# Patient Record
Sex: FEMALE | Race: WHITE | NOT HISPANIC OR LATINO | Employment: OTHER | URBAN - METROPOLITAN AREA
[De-identification: names, ages, dates, MRNs, and addresses within clinical notes are randomized per-mention and may not be internally consistent; named-entity substitution may affect disease eponyms.]

---

## 2020-05-18 ENCOUNTER — TRANSFERRED RECORDS (OUTPATIENT)
Dept: HEALTH INFORMATION MANAGEMENT | Facility: CLINIC | Age: 30
End: 2020-05-18

## 2020-05-18 ENCOUNTER — MEDICAL CORRESPONDENCE (OUTPATIENT)
Dept: HEALTH INFORMATION MANAGEMENT | Facility: CLINIC | Age: 30
End: 2020-05-18

## 2020-05-19 ENCOUNTER — TRANSCRIBE ORDERS (OUTPATIENT)
Dept: MATERNAL FETAL MEDICINE | Facility: CLINIC | Age: 30
End: 2020-05-19

## 2020-05-19 DIAGNOSIS — O26.90 PREGNANCY RELATED CONDITION, ANTEPARTUM: Primary | ICD-10-CM

## 2020-06-09 ENCOUNTER — HOSPITAL ENCOUNTER (OUTPATIENT)
Dept: CARDIOLOGY | Facility: CLINIC | Age: 30
Discharge: HOME | End: 2020-06-09
Admitting: PEDIATRICS
Payer: COMMERCIAL

## 2020-06-09 DIAGNOSIS — O26.90 PREGNANCY RELATED CONDITION, ANTEPARTUM: ICD-10-CM

## 2020-06-09 PROCEDURE — 76825 ECHO EXAM OF FETAL HEART: CPT

## 2020-06-09 NOTE — PROGRESS NOTES
[facility]   Heart Center Fetal Consult Note    Patient:  Destinee Caba MRN:  [id number]   YOB: 1990 Age:  29 year old   Date of Visit:  2020 PCP:  No primary care provider on file.     Dear Dr. Ortiz,     I had the pleasure of seeing Destinee Caba at the [facility] on 2020 in fetal cardiology consultation for fetal echocardiogram results. She presented today accompanied by herself. As you know, she is a 29 year old who presents for type II diabetes mellitus on insulin.     I performed and interpreted the fetal echocardiogram today, which demonstrated normal fetal cardiac anatomy. Normal fetal intracardiac connections. Normal right and left ventricular size and function. Fetal heart rate is regular at 157 bpm. No hydrops.    I reviewed the echo findings today with Destinee Caba. She is aware that the study was within normal limits with no major cardiac abnormalities. She is aware of the general limitations of fetal echocardiography. No additional fetal echocardiograms are recommended. No  cardiac follow-up is required.     Thank you for allowing me to participate in Destinee's care. Please do not hesitate to contact me with questions or concerns.    This visit was separate from the performance and interpretation of the ultrasound. The majority of the time (>50%) was spent in counseling and coordination of care. I spent approximately 15 minutes in face-to-face time reviewing the above considerations.    Tiffanie Jorge M.D.  Pediatric Cardiology  [address], 5th floor, [facility] [address]  Phone [phone]  Fax [phone]

## 2022-05-02 ENCOUNTER — PRENATAL OFFICE VISIT (OUTPATIENT)
Dept: FAMILY MEDICINE | Facility: CLINIC | Age: 32
End: 2022-05-02
Payer: COMMERCIAL

## 2022-05-02 VITALS — HEIGHT: 66 IN

## 2022-05-02 DIAGNOSIS — Z34.90 SUPERVISION OF NORMAL PREGNANCY: Primary | ICD-10-CM

## 2022-05-02 PROCEDURE — 99207 PR NO CHARGE NURSE ONLY: CPT

## 2022-05-02 RX ORDER — PRENATAL VIT/IRON FUM/FOLIC AC 27MG-0.8MG
1 TABLET ORAL DAILY
COMMUNITY

## 2022-05-02 RX ORDER — ALBUTEROL SULFATE 90 UG/1
1-2 AEROSOL, METERED RESPIRATORY (INHALATION)
COMMUNITY
Start: 2021-12-15

## 2022-05-02 NOTE — PROGRESS NOTES
OB Intake phone visit with verbal patient permission.    Tested positive for covid 12/2021.  She is not vaccinated.

## 2022-05-23 ENCOUNTER — TELEPHONE (OUTPATIENT)
Dept: ENDOCRINOLOGY | Facility: CLINIC | Age: 32
End: 2022-05-23

## 2022-05-23 ENCOUNTER — LAB (OUTPATIENT)
Dept: LAB | Facility: CLINIC | Age: 32
End: 2022-05-23
Payer: COMMERCIAL

## 2022-05-23 ENCOUNTER — HOSPITAL ENCOUNTER (OUTPATIENT)
Dept: ULTRASOUND IMAGING | Facility: CLINIC | Age: 32
Discharge: HOME | End: 2022-05-23
Attending: OBSTETRICS & GYNECOLOGY | Admitting: OBSTETRICS & GYNECOLOGY
Payer: COMMERCIAL

## 2022-05-23 ENCOUNTER — PRENATAL OFFICE VISIT (OUTPATIENT)
Dept: OBGYN | Facility: CLINIC | Age: 32
End: 2022-05-23
Payer: COMMERCIAL

## 2022-05-23 VITALS
OXYGEN SATURATION: 99 % | WEIGHT: 168.6 LBS | HEART RATE: 97 BPM | BODY MASS INDEX: 27.1 KG/M2 | HEIGHT: 66 IN | SYSTOLIC BLOOD PRESSURE: 110 MMHG | DIASTOLIC BLOOD PRESSURE: 60 MMHG

## 2022-05-23 DIAGNOSIS — O24.410 DIET CONTROLLED GESTATIONAL DIABETES MELLITUS (GDM) IN FIRST TRIMESTER: Primary | ICD-10-CM

## 2022-05-23 DIAGNOSIS — Z34.91 NORMAL PREGNANCY IN FIRST TRIMESTER: Primary | ICD-10-CM

## 2022-05-23 DIAGNOSIS — Z86.32 HISTORY OF INSULIN CONTROLLED GESTATIONAL DIABETES MELLITUS: ICD-10-CM

## 2022-05-23 DIAGNOSIS — Z34.90 SUPERVISION OF NORMAL PREGNANCY: ICD-10-CM

## 2022-05-23 LAB
ABO/RH(D): NORMAL
ANTIBODY SCREEN: NEGATIVE
ERYTHROCYTE [DISTWIDTH] IN BLOOD BY AUTOMATED COUNT: 12.4 % (ref 10–15)
HBA1C MFR BLD: 5.9 % (ref 0–5.6)
HCT VFR BLD AUTO: 38.9 % (ref 35–47)
HGB BLD-MCNC: 12.7 G/DL (ref 11.7–15.7)
MCH RBC QN AUTO: 28 PG (ref 26.5–33)
MCHC RBC AUTO-ENTMCNC: 32.6 G/DL (ref 31.5–36.5)
MCV RBC AUTO: 86 FL (ref 78–100)
PLATELET # BLD AUTO: 191 10E3/UL (ref 150–450)
RBC # BLD AUTO: 4.53 10E6/UL (ref 3.8–5.2)
SPECIMEN EXPIRATION DATE: NORMAL
WBC # BLD AUTO: 6.7 10E3/UL (ref 4–11)

## 2022-05-23 PROCEDURE — 86762 RUBELLA ANTIBODY: CPT

## 2022-05-23 PROCEDURE — 87086 URINE CULTURE/COLONY COUNT: CPT

## 2022-05-23 PROCEDURE — 83036 HEMOGLOBIN GLYCOSYLATED A1C: CPT

## 2022-05-23 PROCEDURE — 36415 COLL VENOUS BLD VENIPUNCTURE: CPT

## 2022-05-23 PROCEDURE — 76805 OB US >/= 14 WKS SNGL FETUS: CPT

## 2022-05-23 PROCEDURE — 86780 TREPONEMA PALLIDUM: CPT

## 2022-05-23 PROCEDURE — 99207 PR PRENATAL VISIT: CPT | Performed by: OBSTETRICS & GYNECOLOGY

## 2022-05-23 PROCEDURE — 87340 HEPATITIS B SURFACE AG IA: CPT

## 2022-05-23 PROCEDURE — 86803 HEPATITIS C AB TEST: CPT

## 2022-05-23 PROCEDURE — 86901 BLOOD TYPING SEROLOGIC RH(D): CPT

## 2022-05-23 PROCEDURE — 85027 COMPLETE CBC AUTOMATED: CPT

## 2022-05-23 PROCEDURE — 86850 RBC ANTIBODY SCREEN: CPT

## 2022-05-23 PROCEDURE — 87389 HIV-1 AG W/HIV-1&-2 AB AG IA: CPT

## 2022-05-23 PROCEDURE — 86900 BLOOD TYPING SEROLOGIC ABO: CPT

## 2022-05-23 NOTE — TELEPHONE ENCOUNTER
[facility] Call Center    Phone Message    May a detailed message be left on voicemail: yes     Reason for Call: Other: Patient is being referred to be seen for  Normal pregnancy in first trimester; Hx of GDM. Ref by Dr Hester. Patient is scheduled on 9/28/22 at 12:00pm with Dr Hawkins. Appointment is outside the 3 day time frame per guidelines. Sending encounter to clinic for review. Please call patient to schedule.     Action Taken: Message routed to:  [facility] ([facility]): Endocrinology    Travel Screening: Not Applicable

## 2022-05-23 NOTE — TELEPHONE ENCOUNTER
Please schedule patient with DIabetes Education and MD  Within one week . Svetlana Webster RN on 5/23/2022 at 4:35 PM

## 2022-05-24 LAB
HBV SURFACE AG SERPL QL IA: NONREACTIVE
HCV AB SERPL QL IA: NONREACTIVE
HIV 1+2 AB+HIV1 P24 AG SERPL QL IA: NONREACTIVE
RUBV IGG SERPL QL IA: 1.2 INDEX
RUBV IGG SERPL QL IA: POSITIVE
T PALLIDUM AB SER QL: NONREACTIVE

## 2022-05-25 LAB — BACTERIA UR CULT: NORMAL

## 2022-05-31 NOTE — TELEPHONE ENCOUNTER
RECORDS RECEIVED FROM: internal    DATE RECEIVED: 6/7/22    NOTES (FOR ALL VISITS) STATUS DETAILS   OFFICE NOTES from referring provider internal  Dr Hester   OFFICE NOTES from other specialist     ED NOTES internal  9.24.20    OPERATIVE REPORT  (thyroid, pituitary, adrenal, parathyroid)     MEDICATION LIST internal       LABS     DIABETES: HBGA1C, CREATININE, FASTING LIPIDS, MICROALBUMIN URINE, POTASSIUM, TSH, T4    THYROID: TSH, T4, CBC, THYRODLONULIN, TOTAL T3, FREE T4, CALCITONIN, CEA internal  PTIQ5B-5.23.22  Cbc- 5.23.22

## 2022-06-02 NOTE — PROGRESS NOTES
"  SUBJECTIVE:     HPI:    This is a 31 year old female patient,  who presents for her first obstetrical visit.    MAURICIO: 2022, by Last Menstrual Period.  She is 16w6d weeks.  Her cycles are regular.  Her last menstrual period was normal.   Since her LMP, she has had no complaints).   She denies nausea and vaginal bleeding.    Additional History: Destinee has been pregnant 3 times and each gestation was noted for history of gest DM.  She has had both diet controlled as well as insulin medicated management.  We discussed having endocrine evaluate early in pregnant for that reason.    Have you travelled during the pregnancy?No  Have your sexual partner(s) travelled during the pregnancy?No      HISTORY:   Planned Pregnancy: Yes  Marital Status:     Living in Household: Spouse and Children    Past History:  Her past medical history is non-contributory.      She has a history of  gestational diabetes, diet controlled and gestational diabetes, insulin controlled    Since her last LMP she denies use of alcohol, tobacco and street drugs.    Past medical, surgical, social and family history were reviewed and updated in [facility].        Current Outpatient Medications   Medication     albuterol (PROAIR HFA/PROVENTIL HFA/VENTOLIN HFA) 108 (90 Base) MCG/ACT inhaler     Loratadine (CLARITIN PO)     Prenatal Vit-Fe Fumarate-FA (PRENATAL MULTIVITAMIN W/IRON) 27-0.8 MG tablet     No current facility-administered medications for this visit.       ROS:   12 point review of systems negative other than symptoms noted below or in the HPI.      OBJECTIVE:     EXAM:  /60 (BP Location: Right arm, Patient Position: Sitting, Cuff Size: Adult Regular)   Pulse 97   Ht 1.684 m (5' 6.3\")   Wt 76.5 kg (168 lb 9.6 oz)   LMP 2022   SpO2 99%   BMI 26.97 kg/m   Body mass index is 26.97 kg/m .  HEENT  Normal  Lungs CTA  Heart RRR  Breast deferred  Abd soft non tender + BS  No HSM  Ext without edema      ASSESSMENT/PLAN: "       ICD-10-CM    1. Normal pregnancy in first trimester  Z34.91 Adult Endocrinology  Referral   2. History of insulin controlled gestational diabetes mellitus  Z86.32 Adult Endocrinology  Referral       31 year old , 16w6d weeks of pregnancy with MAURICIO of 2022, by Last Menstrual Period    Discussed as follows:as above    Counseling given:   - Follow up in 4-6 weeks for return OB visit.  - Recommended weight gain for pregnancy: 15-25 lbs.   Follow up with endo for dm eval and management         Flaquito Hester MD

## 2022-06-02 NOTE — ADDENDUM NOTE
Addended by: KRISTIN JOHNSON on: 6/2/2022 09:51 AM     Modules accepted: Level of Service, SmartSet

## 2022-06-03 NOTE — PROGRESS NOTES
Outcome for 06/03/22 1:48 PM: Left Voicemail   TOBY Medina   Outcome for 06/06/22 9:35 AM: Data obtained via phone and located below  TOBY Medina    May 23    5/23: , after breakfast 140, after lunch 137, after supper 117  5/24: , after breakfast 150, after lunch 138, after supper 125  5/25: , after breakfast 140, after lunch 117, after supper 133  5/26: , after breakfast 150, after lunch 111, after supper 110    Was on vacation and did not have meter with from 5/27-6/4.    6/5: , after breakfast 145, after lunch 112, after supper 133  6/6:

## 2022-06-06 NOTE — TELEPHONE ENCOUNTER
Spoke with patient. She declined to schedule with CDE. She said she would like to discuss with Dr Song during her appt on 6/7 if seeing CDE is necessary

## 2022-06-07 ENCOUNTER — PRE VISIT (OUTPATIENT)
Dept: ENDOCRINOLOGY | Facility: CLINIC | Age: 32
End: 2022-06-07

## 2022-06-07 ENCOUNTER — VIRTUAL VISIT (OUTPATIENT)
Dept: ENDOCRINOLOGY | Facility: CLINIC | Age: 32
End: 2022-06-07
Payer: COMMERCIAL

## 2022-06-07 DIAGNOSIS — Z34.91 NORMAL PREGNANCY IN FIRST TRIMESTER: ICD-10-CM

## 2022-06-07 DIAGNOSIS — Z86.32 HISTORY OF INSULIN CONTROLLED GESTATIONAL DIABETES MELLITUS: ICD-10-CM

## 2022-06-07 PROCEDURE — 99204 OFFICE O/P NEW MOD 45 MIN: CPT | Mod: 95 | Performed by: INTERNAL MEDICINE

## 2022-06-07 RX ORDER — PEN NEEDLE, DIABETIC 32GX 5/32"
NEEDLE, DISPOSABLE MISCELLANEOUS
Qty: 200 EACH | Refills: 3 | Status: SHIPPED | OUTPATIENT
Start: 2022-06-07

## 2022-06-07 NOTE — PROGRESS NOTES
Destinee Caba  is being evaluated via a billable video visit.      How would you like to obtain your AVS? [employer]  For the video visit, send the invitation by: Send to e-mail at: [email or web address]  Will anyone else be joining your video visit? No        Endocrinology Clinic New Consult Video viist  Time visit started: 8 AM  Time visit  Ended: 8: 44 AM  Location of patient: home  Location of provider: [employer]  Platform: Nati     Destinee Caba MRN:[id number] YOB: 1990  Primary care provider: No Ref-Primary, Physician     Reason for Endocrine consult: Hx of insulin controlled GDM and pregnancy    HPI:  Destinee Caba is a 31 year old female  , currently 17w 4 d pregnant with MAURICIO: 22. HbA1c: 5.9 in 22.  Weight 76.5 kg    Destinee has been pregnant 3 times and each gestation was noted for history of gestational DM.  She has had both diet controlled as well as insulin medicated management.     First pregnancy was a miscarriage  Second pregnancy she had a boy, had shoulder dystocia. During that pregnancy she used glyburide at night time and that was enough to keep her BG under target    From chart review, for her third pregnancy at term she has used NPH 55 units at bedtime and novolog or humalog with meals upto 14 units with each meal. She has previously seen diabetes educator at OSH for carb counting and was advised to do 2-3 carb choices per meal.    For her 4 th pregnancy , she had her OBGYN care at [place],  At 12 weeks pregnant she had her HbA1c was checked and it was at 9. She used NPH 12 units at bedtime throughout the preganncy and after diet modification her HbA1c was 5.1.    Every post partum 6 week check her BG were well controlled and she was never on any oral or injections for her blood glucoses.     Denies any nausea, vomiting, She reports losing weight on cutting down her carbs and increase physical activity here usual weight is 165-168 lbs.  "Being followed by her OBGYN.  Her most recent fetal US was okay.Denies any  Numbness or tingling, intermittent blurring of vision. She hasn't seen a diabetes educator this pregnancy    Diet:  3 meals a day, 3 snacks in between each including high protein snack before bed  Breakfast is the biggest meal for the day. Snack on string cheese, meat, nuts with dark chocolate, carrots and hummus. She does carb counting for her meals - does 2 carb choices    Physical activity: Does go for walks with her kids, does weight lifts 2 times a week, low impact cardio - 3 times a week    Checking B times a day , fasting and 1 hr after meals    May 23     5/23: , after breakfast 140, after lunch 137, after supper 117  : , after breakfast 150, after lunch 138, after supper 125  : , after breakfast 140, after lunch 117, after supper 133  : , after breakfast 150, after lunch 111, after supper 110     Was on vacation and did not have meter with from -.     : , after breakfast 145, after lunch 112, after supper 133  :     Any low BG: At around 60-70 mg/dl, she starts to feel jittery, anxiety, shakes, feels weak. She corrects with 15 gms of carb and adds protein. Like granola bar with cheese stick. She has had low BG between lunch and dinner or is she skips snack at 2: 30 Pm or 3 pm.     FH: mother, father, grandparents have  T2DM    SH: , has 3 kids, no tobacco, 2 drinks of  alcohol use when not pregnant, no illicit drug use.      ROS:  All 12 systems were reviewed and negative except as mentioned in HPI    Past Medical/Surgical History:  Past Medical History:   Diagnosis Date     History of asthma     \"seasonal\"     History of gestational diabetes     \"high pre-diabetic level vs. type 2 per endocrinologist\"     Past Surgical History:   Procedure Laterality Date     WISDOM TOOTH EXTRACTION         Allergies:  No Known Allergies    PTA Meds:  Prior to Admission " medications    Medication Sig Last Dose Taking? Auth Provider   albuterol (PROAIR HFA/PROVENTIL HFA/VENTOLIN HFA) 108 (90 Base) MCG/ACT inhaler Inhale 1-2 puffs into the lungs   Reported, Patient   Loratadine (CLARITIN PO)    Reported, Patient   Prenatal Vit-Fe Fumarate-FA (PRENATAL MULTIVITAMIN W/IRON) 27-0.8 MG tablet Take 1 tablet by mouth daily   Reported, Patient        Current heard:   Current Outpatient Medications   Medication     albuterol (PROAIR HFA/PROVENTIL HFA/VENTOLIN HFA) 108 (90 Base) MCG/ACT inhaler     Loratadine (CLARITIN PO)     Prenatal Vit-Fe Fumarate-FA (PRENATAL MULTIVITAMIN W/IRON) 27-0.8 MG tablet     No current facility-administered medications for this visit.       Family History:  Family History   Problem Relation Age of Onset     Obesity Mother      Diabetes Mother         type 2     Obesity Father      Diabetes Father         type 2     Asthma Brother      No Known Problems Daughter      No Known Problems Son      No Known Problems Son        Social History:  Social History     Tobacco Use     Smoking status: Never Smoker     Smokeless tobacco: Never Used   Substance Use Topics     Alcohol use: Not on file         Physical examination:  General appearance: seated comfortably in the chair during assessment. Not in any acute distress  Lungs: Speaking full sentences  Neurological: conscious and oriented. Speech: normal.   Psychiatric: normal mood and affect. Normal judgment    Endocrine Labs:  ENDO DIABETES Latest Ref Rng & Units 2022   A1C 0.0 - 5.6 % 5.9 (H)   HCT 35.0 - 47.0 % 38.9   HGB 11.7 - 15.7 g/dL 12.7   RBC 3.80 - 5.20 10e6/uL 4.53   RDW 10.0 - 15.0 % 12.4   WBC 4.0 - 11.0 10e3/uL 6.7   MCH 26.5 - 33.0 pg 28.0   MCHC 31.5 - 36.5 g/dL 32.6   MCV 78 - 100 fL 86    - 450 10e3/uL 191          Assessment and Plan:     Destinee Caba is a 31 year old female  , currently 17w 4 d pregnant with MAURICIO: 22. HbA1c: 5.9 in 22 refrred to our clinic for  Gestational diabetes management    # Gestational Diabetes , 17w 4d pregnant, HbA11: 5.9 in 5/23/2022    She has had gestational diabetes with 3 of her pregnancies.  For her previous pregnancy she was requiring NPH 55 units and NovoLog 14 units with meals at term.  Her diet is fairly well controlled in terms of carb intake takes 2 carbs per meal with breakfast being her biggest meal.  Her weight gain is not as much as expected raising the concern for inadequate nourishment.  On reviewing her blood glucose her blood glucoses are above target fasting and 1 hour post breakfast.  Will benefit from NPH and dietitian input.  Anticipate she will need higher doses of insulin as her pregnancy progresses.    -Reviewed blood glucose goals during pregnancy  Fasting< 95 mg/dl  1 hr after meal: < 140 mg/dl  2 hr after meal< 120 mg/dl  -Recommended she check blood glucose fasting, 1 hr after each meal  -Recommend NPH 10 units subcutaneous  at bedtime, if fasting BG > 95 mg/dl , can increase the NPH dose by 2 units every 3 days fasting glucose <  95 mg/dL  -Discuss with her OBGYN if she is getting adequate carbohydrate intake   - Made a diabetes educator and dietician referral in 1 week  -Ordered a BMP to check for creatinine    RTC in 4 weeks and follow-up with CDE and dietitian interim for adjustments    The patient was seen, examined and discussed with MD Stanley East MD.  Endocrinology fellow     I have seen and examined the patient, reviewed and edited the fellow's note, and agree with the plan of care.  MARIE Mcarthur

## 2022-06-07 NOTE — PATIENT INSTRUCTIONS
Blood glucose goals during pregnancy  Fasting< 95 mg/dl  1 hr after meal: < 140 mg/dl  2 hr after meal< 120 mg/dl    Check blood glucose fasting, 1 hr after each meal    Start taking NPH 10 units subcutaneous  at bedtime, if fasting BG > 95 mg/dl , you can increase the NPH dose by 2 units every 3 days    Please speak with your OBGYN if you are getting adequate carbohydrate intake and have adequate nourishment    We made a diabetes educator and dietician referral    Get a BMP blood work done - for kidney function    Please reach out to us for any questions or concerns through [facility]

## 2022-06-07 NOTE — LETTER
2022       RE: Destinee Caba  [address] [facility] [address]     Dear Colleague,    Thank you for referring your patient, Destinee Caba, to the [facility] ENDOCRINOLOGY [facility] [place] [facility]. Please see a copy of my visit note below.    Outcome for 22 1:48 PM: Left Voicemail   Haydee Fischer, TOBY   Outcome for 22 9:35 AM: Data obtained via phone and located below  TOBY Medina    May 23    5/23: , after breakfast 140, after lunch 137, after supper 117  : , after breakfast 150, after lunch 138, after supper 125  : , after breakfast 140, after lunch 117, after supper 133  : , after breakfast 150, after lunch 111, after supper 110    Was on vacation and did not have meter with from -.    : , after breakfast 145, after lunch 112, after supper 133  :         Destinee Caba  is being evaluated via a billable video visit.      How would you like to obtain your AVS? [employer]  For the video visit, send the invitation by: Send to e-mail at: [email or web address]  Will anyone else be joining your video visit? No        Endocrinology Clinic New Consult Video viist  Time visit started: 8 AM  Time visit  Ended: 8: 44 AM  Location of patient: home  Location of provider: [facility]  Platform: Nati     Destinee Caba MRN:[id number] YOB: 1990  Primary care provider: No Ref-Primary, Physician     Reason for Endocrine consult: Hx of insulin controlled GDM and pregnancy    HPI:  Destinee Caba is a 31 year old female  , currently 17w 4 d pregnant with MAURICIO: 22. HbA1c: 5.9 in 22.  Weight 76.5 kg    Destinee has been pregnant 3 times and each gestation was noted for history of gestational DM.  She has had both diet controlled as well as insulin medicated management.     First pregnancy was a miscarriage  Second pregnancy she  had a boy, had shoulder dystocia. During that pregnancy she used glyburide at night time and that was enough to keep her BG under target    From chart review, for her third pregnancy at term she has used NPH 55 units at bedtime and novolog or humalog with meals upto 14 units with each meal. She has previously seen diabetes educator at OSH for carb counting and was advised to do 2-3 carb choices per meal.    For her 4 th pregnancy , she had her OBGYN care at [place],  At 12 weeks pregnant she had her HbA1c was checked and it was at 9. She used NPH 12 units at bedtime throughout the preganncy and after diet modification her HbA1c was 5.1.    Every post partum 6 week check her BG were well controlled and she was never on any oral or injections for her blood glucoses.     Denies any nausea, vomiting, She reports losing weight on cutting down her carbs and increase physical activity here usual weight is 165-168 lbs. Being followed by her OBGYN.  Her most recent fetal US was okay.Denies any  Numbness or tingling, intermittent blurring of vision. She hasn't seen a diabetes educator this pregnancy    Diet:  3 meals a day, 3 snacks in between each including high protein snack before bed  Breakfast is the biggest meal for the day. Snack on string cheese, meat, nuts with dark chocolate, carrots and hummus. She does carb counting for her meals - does 2 carb choices    Physical activity: Does go for walks with her kids, does weight lifts 2 times a week, low impact cardio - 3 times a week    Checking B times a day , fasting and 1 hr after meals    May 23     5/23: , after breakfast 140, after lunch 137, after supper 117  : , after breakfast 150, after lunch 138, after supper 125  : , after breakfast 140, after lunch 117, after supper 133  : , after breakfast 150, after lunch 111, after supper 110     Was on vacation and did not have meter with from -.     : , after breakfast  "145, after lunch 112, after supper 133  6/6:     Any low BG: At around 60-70 mg/dl, she starts to feel jittery, anxiety, shakes, feels weak. She corrects with 15 gms of carb and adds protein. Like granola bar with cheese stick. She has had low BG between lunch and dinner or is she skips snack at 2: 30 Pm or 3 pm.     FH: mother, father, grandparents have  T2DM    SH: , has 3 kids, no tobacco, 2 drinks of  alcohol use when not pregnant, no illicit drug use.      ROS:  All 12 systems were reviewed and negative except as mentioned in HPI    Past Medical/Surgical History:  Past Medical History:   Diagnosis Date     History of asthma     \"seasonal\"     History of gestational diabetes     \"high pre-diabetic level vs. type 2 per endocrinologist\"     Past Surgical History:   Procedure Laterality Date     WISDOM TOOTH EXTRACTION  2010       Allergies:  No Known Allergies    PTA Meds:  Prior to Admission medications    Medication Sig Last Dose Taking? Auth Provider   albuterol (PROAIR HFA/PROVENTIL HFA/VENTOLIN HFA) 108 (90 Base) MCG/ACT inhaler Inhale 1-2 puffs into the lungs   Reported, Patient   Loratadine (CLARITIN PO)    Reported, Patient   Prenatal Vit-Fe Fumarate-FA (PRENATAL MULTIVITAMIN W/IRON) 27-0.8 MG tablet Take 1 tablet by mouth daily   Reported, Patient        Current heard:   Current Outpatient Medications   Medication     albuterol (PROAIR HFA/PROVENTIL HFA/VENTOLIN HFA) 108 (90 Base) MCG/ACT inhaler     Loratadine (CLARITIN PO)     Prenatal Vit-Fe Fumarate-FA (PRENATAL MULTIVITAMIN W/IRON) 27-0.8 MG tablet     No current facility-administered medications for this visit.       Family History:  Family History   Problem Relation Age of Onset     Obesity Mother      Diabetes Mother         type 2     Obesity Father      Diabetes Father         type 2     Asthma Brother      No Known Problems Daughter      No Known Problems Son      No Known Problems Son        Social History:  Social History "     Tobacco Use     Smoking status: Never Smoker     Smokeless tobacco: Never Used   Substance Use Topics     Alcohol use: Not on file         Physical examination:  General appearance: seated comfortably in the chair during assessment. Not in any acute distress  Lungs: Speaking full sentences  Neurological: conscious and oriented. Speech: normal.   Psychiatric: normal mood and affect. Normal judgment    Endocrine Labs:  ENDO DIABETES Latest Ref Rng & Units 2022   A1C 0.0 - 5.6 % 5.9 (H)   HCT 35.0 - 47.0 % 38.9   HGB 11.7 - 15.7 g/dL 12.7   RBC 3.80 - 5.20 10e6/uL 4.53   RDW 10.0 - 15.0 % 12.4   WBC 4.0 - 11.0 10e3/uL 6.7   MCH 26.5 - 33.0 pg 28.0   MCHC 31.5 - 36.5 g/dL 32.6   MCV 78 - 100 fL 86    - 450 10e3/uL 191          Assessment and Plan:     Destinee Caba is a 31 year old female  , currently 17w 4 d pregnant with MAURICIO: 22. HbA1c: 5.9 in 22 refrred to our clinic for Gestational diabetes management    # Gestational Diabetes , 17w 4d pregnant, HbA11: 5.9 in 2022    She has had gestational diabetes with 3 of her pregnancies.  For her previous pregnancy she was requiring NPH 55 units and NovoLog 14 units with meals at term.  Her diet is fairly well controlled in terms of carb intake takes 2 carbs per meal with breakfast being her biggest meal.  Her weight gain is not as much as expected raising the concern for inadequate nourishment.  On reviewing her blood glucose her blood glucoses are above target fasting and 1 hour post breakfast.  Will benefit from NPH and dietitian input.  Anticipate she will need higher doses of insulin as her pregnancy progresses.    -Reviewed blood glucose goals during pregnancy  Fasting< 95 mg/dl  1 hr after meal: < 140 mg/dl  2 hr after meal< 120 mg/dl  -Recommended she check blood glucose fasting, 1 hr after each meal  -Recommend NPH 10 units subcutaneous  at bedtime, if fasting BG > 95 mg/dl , can increase the NPH dose by 2 units every 3 days  fasting glucose <  95 mg/dL  -Discuss with her OBGYN if she is getting adequate carbohydrate intake   - Made a diabetes educator and dietician referral in 1 week  -Ordered a BMP to check for creatinine    RTC in 4 weeks and follow-up with CDE and dietitian interim for adjustments    The patient was seen, examined and discussed with MD Stanley East MD.  Endocrinology fellow     I have seen and examined the patient, reviewed and edited the fellow's note, and agree with the plan of care.  MARIE Mcarthur

## 2022-06-22 ENCOUNTER — HOSPITAL ENCOUNTER (OUTPATIENT)
Dept: ULTRASOUND IMAGING | Facility: CLINIC | Age: 32
Discharge: HOME | End: 2022-06-22
Attending: OBSTETRICS & GYNECOLOGY | Admitting: OBSTETRICS & GYNECOLOGY
Payer: COMMERCIAL

## 2022-06-22 DIAGNOSIS — Z34.92 NORMAL PREGNANCY, SECOND TRIMESTER: ICD-10-CM

## 2022-06-22 PROCEDURE — 76805 OB US >/= 14 WKS SNGL FETUS: CPT

## 2022-06-29 ENCOUNTER — PRENATAL OFFICE VISIT (OUTPATIENT)
Dept: OBGYN | Facility: OTHER | Age: 32
End: 2022-06-29
Payer: COMMERCIAL

## 2022-06-29 VITALS
WEIGHT: 174 LBS | OXYGEN SATURATION: 99 % | SYSTOLIC BLOOD PRESSURE: 100 MMHG | DIASTOLIC BLOOD PRESSURE: 60 MMHG | BODY MASS INDEX: 27.83 KG/M2 | HEART RATE: 88 BPM

## 2022-06-29 DIAGNOSIS — Z86.32 HISTORY OF INSULIN CONTROLLED GESTATIONAL DIABETES MELLITUS: Primary | ICD-10-CM

## 2022-06-29 PROCEDURE — 99207 PR PRENATAL VISIT: CPT | Performed by: OBSTETRICS & GYNECOLOGY

## 2022-06-29 NOTE — PROGRESS NOTES
Reviewed 20 week US. Will have recheck at 28 weeks for AC/HC ratio and growth  Doing fine, on insulin  Discussed induction at 39 weeks and possible strip membranes at 38  Pt notes she had too frequent testing last pregnancy and would like to minimize that.  Schedule of testing in sticky note  History of big babies (but consistently smaller than US predictions)  RTC one month

## 2022-06-29 NOTE — PATIENT INSTRUCTIONS
If you have any questions regarding your visit, Please contact your care team.     [facility]: [phone]    To Schedule an Appointment 24/7  Call: [phone] HOU[facility] TELEPHONE NUMBER     Flaquito Hester MD    Medical Assistant    Reanna Muir-Surgery Scheduler  Abbi-Surgery Scheduler     Monday-[place]  1:00 pm-4:30 pm    Tuesday-[place]  7:30 am-4:30 pm    Wednesday-[place]  7:30 am-4:30 pm        Typical Surgery Days: Monday or Thursday       [address] [address]  [phone] appointment line  [phone] Fax    Imaging Scheduling all locations  [phone]    **Surgeries** Our Surgery Schedulers will contact you to schedule. If you do not receive a call within 3 business days, please call [phone].    If you need a medication refill, please contact your pharmacy. Please allow 3 business days for your refill to be completed.    As always, Thank you for trusting us with your healthcare needs!                   see additional instructions from your care team below

## 2022-07-06 ENCOUNTER — MYC MEDICAL ADVICE (OUTPATIENT)
Dept: ENDOCRINOLOGY | Facility: CLINIC | Age: 32
End: 2022-07-06

## 2022-07-06 NOTE — PROGRESS NOTES
Outcome for 22 9:13 AM: [email or web address] message sent  TOBY De La Cruz  Outcome for 22 8:32 AM: Glucose Readings sent via [email or web address]  TOBY Medina      Destinee Caba  is being evaluated via a billable video visit.      How would you like to obtain your AVS? Ja[employer]r  For the video visit, send the invitation by: Send to e-mail at: [email or web address]  Will anyone else be joining your video visit? No        Diabetes Clinic  Consult Video viist  Time visit started: 1:14 AM  Time visit  Ended: 1: 42 AM  Location of patient: home  Location of provider: [facility]  Platform: Myahcarla       Destinee Caba MRN:[id number] YOB: 1990  Primary care provider: No Ref-Primary, Physician     Reason for Endocrine consult: Hx of insulin controlled GDM and pregnancy    HPI:  Destinee Caba is a 31 year old female  , currently 22 w 4 d pregnant with MAURICIO: 22. HbA1c: 5.9 in 22.  Weight 76.5 kg    Per chart review:  Destinee has been pregnant 3 times and each gestation was noted for history of gestational DM.  She has had both diet controlled as well as insulin medicated management.     First pregnancy was a miscarriage  Second pregnancy she had a boy, had shoulder dystocia. During that pregnancy she used glyburide at night time and that was enough to keep her BG under target    Her third pregnancy at term she has used NPH 55 units at bedtime and novolog or humalog with meals upto 14 units with each meal. She has previously seen diabetes educator at OSH for carb counting and was advised to do 2-3 carb choices per meal.    For her 4 th pregnancy , she had her OBGYN care at [place],  At 12 weeks pregnant she had her HbA1c was checked and it was at 9. She used NPH 12 units at bedtime throughout the preganncy and after diet modification her HbA1c was 5.1.    Every post partum 6 week check her BG were well controlled and she was never on any oral or injections for her blood  glucoses.     Today:     Here are the 2 weeks' blood sugar readings.  I put them in the following order - date: fasting, post-breakfast, post-lunch, post-dinner.   If I put an x, I missed that reading.  With the holiday and hosting several events I did, unfortunately, miss a few readings.  I am keeping with my diet despite the holiday and hosting, but just busy things kept me from checking a few times.   6/28: 119, 104, 110, 134  6/29: 95, 96, 110, 124  6/30: 108, 149, 112, 123  7/1: 106, 139, 114, 126  7/2: 106, 126, 112, 103  7/3: 111, x, x ,x  7/4: 109, 101, 123, x  7/5: 99, 138, 136, 92  7/6: 102, 115, 128, 103  7/7: 107, 108, 117, 121  7/8: 104, 132, 99    Last 3 fasting 85, 84 90. 1 hour after meal. Thinks on June 30th was still on vacation.       As an update on my overnight insulin, I am up to 28 units.  I am increasing with 2 units every other night as per instructed in my last visit.  My nighttime routine was also out of whack with the hosting and holiday, so I am hoping to really dial in that overnight number here in the next few nights with a more regular schedule and night routine.        -   Done checking on ketones, never has any.  Doesn't feel depriving herself.  Has aversion to coffee when pregnant.  Doing carb snacks and it has helping.  She has consistently been eating as follows:  Breakfast 20 g, tries 15 g snack, Lunch 30 g carb, and 15 g snack, 30 g at dinner - bedtime snack 20 g - feel full better .  Doesn't count dairy as a carb - Counts potatoes rice, greek yogurt, would count squash. Was cutting back at breakfast, but generally isn't neccessary now with insulin.  Checking 1 hour after meals.  Denies any symptoms of low blood sugars. (or high) though has had both in the past.      Hoping to limit appointment as feels she knows what she is doing and will contact us if BG too high.      FH: mother, father, grandparents have  T2DM    SH: , has 3 kids, no tobacco, Limited alcohol use when  "not pregnant, no illicit drug use.      ROS:  All 12 systems were reviewed and negative except as mentioned in HPI    Past Medical/Surgical History:  Past Medical History:   Diagnosis Date     History of asthma     \"seasonal\"     History of gestational diabetes     \"high pre-diabetic level vs. type 2 per endocrinologist\"     Past Surgical History:   Procedure Laterality Date     WISDOM TOOTH EXTRACTION  2010       Allergies:  No Known Allergies    PTA Meds:  Prior to Admission medications    Medication Sig Last Dose Taking? Auth Provider   albuterol (PROAIR HFA/PROVENTIL HFA/VENTOLIN HFA) 108 (90 Base) MCG/ACT inhaler Inhale 1-2 puffs into the lungs   Reported, Patient   Loratadine (CLARITIN PO)    Reported, Patient   Prenatal Vit-Fe Fumarate-FA (PRENATAL MULTIVITAMIN W/IRON) 27-0.8 MG tablet Take 1 tablet by mouth daily   Reported, Patient        Current heard:   Current Outpatient Medications   Medication     albuterol (PROAIR HFA/PROVENTIL HFA/VENTOLIN HFA) 108 (90 Base) MCG/ACT inhaler     insulin  UNIT/ML injection     insulin pen needle (BD ELBERT U/F) 32G X 4 MM miscellaneous     Loratadine (CLARITIN PO)     Prenatal Vit-Fe Fumarate-FA (PRENATAL MULTIVITAMIN W/IRON) 27-0.8 MG tablet     No current facility-administered medications for this visit.       Family History:  Family History   Problem Relation Age of Onset     Obesity Mother      Diabetes Mother         type 2     Obesity Father      Diabetes Father         type 2     Asthma Brother      No Known Problems Daughter      No Known Problems Son      No Known Problems Son        Social History:  Social History     Tobacco Use     Smoking status: Never Smoker     Smokeless tobacco: Never Used   Substance Use Topics     Alcohol use: Not on file         Physical examination:    Wt Readings from Last 10 Encounters:   06/29/22 78.9 kg (174 lb)   05/23/22 76.5 kg (168 lb 9.6 oz)     Estimated body mass index is 27.83 kg/m  as calculated from the " "following:    Height as of 22: 1.684 m (5' 6.3\").    Weight as of 22: 78.9 kg (174 lb).    Exam limited due to video visit due to COVID precautions and patient convenience.  General appearance: seated comfortably in the chair during assessment. Not in any acute distress  Lungs: Speaking full sentences  Neurological: conscious and oriented. Speech: normal.   Psychiatric:  mood is \"good\" and affect warm and appropriate.  Thought form making content are fluent and coherent.  Logical questions.    Endocrine Labs:  ENDO DIABETES Latest Ref Rng & Units 2022   A1C 0.0 - 5.6 % 5.9 (H)   HCT 35.0 - 47.0 % 38.9   HGB 11.7 - 15.7 g/dL 12.7   RBC 3.80 - 5.20 10e6/uL 4.53   RDW 10.0 - 15.0 % 12.4   WBC 4.0 - 11.0 10e3/uL 6.7   MCH 26.5 - 33.0 pg 28.0   MCHC 31.5 - 36.5 g/dL 32.6   MCV 78 - 100 fL 86    - 450 10e3/uL 191     Recent Labs   Lab Test 22  1304   A1C 5.9*            Assessment and Plan:     Destinee Caba is a 31 year old female  , now 22 weeks gestation, with most postprandial blood sugars at goal on NPH insulin, though fasting blood sugars continue to be above goal.    1.  Gestational Diabetes, 22 w 4d.  Postprandial blood sugars at goal, fasting blood sugars above goal.    Destinee is encouraged in the excellent work that she is doing to manage her gestational diabetes.  Goal is 160 g daily of high-quality, high-fiber fruit vegetable whole-grain carbohydrate    Advised:  Please continue to make sure to get a balanced diet including enough carbohydrates (ideally high quality carbohydrates) to support energy needs of pregnancy (generally 160 g daily.)     As long as NO low BG <55 and very rare <70, please increase NPH by 1 unit each time fasting BG exceeds 105 at all or 95 more than 1/5 days.      If 1 hour post meal BG >140 more than 1 in two days (6 post meal checks), and no daytime low BG <70, please start NPH 8 units each morning and increase by 1 unit daily to meet goal.  " Please let us know when this happens.    In her previous pregnancy, she did require NovoLog dosing with meals and we will continue to monitor to see if this is necessary.  RTC 4 weeks as long as meeting goals, otherwise weekly to meet all blood glucose goals.      -Reviewed blood glucose goals during pregnancy  Fasting< 95 mg/dl  1 hr after meal: < 140 mg/dl  2 hr after meal< 120 mg/dl  -Recommended she check blood glucose fasting, 1 hr after each meal  -Discuss with her OBGYN if she is getting adequate carbohydrate intake     RTC in 4 weeks and follow-up with CDE and dietitian interim for adjustments    It is my privilege to be involved in the care of the above patient.     Jackie Macario PA-C, MPAS  [facility]  Diabetes, Endocrinology, and Metabolism  [phone] Appointments/Nurse  [phone] Fax  [phone] pager  [phone] nurse line    36 minutes spent on the date of the encounter doing chart review, history and exam, documentation, education and counseling, as well as communication and coordination of care, and further activities as noted above.  This time excludes time spent reviewing CGM.

## 2022-07-12 ENCOUNTER — VIRTUAL VISIT (OUTPATIENT)
Dept: ENDOCRINOLOGY | Facility: CLINIC | Age: 32
End: 2022-07-12
Payer: COMMERCIAL

## 2022-07-12 DIAGNOSIS — Z86.32 HISTORY OF INSULIN CONTROLLED GESTATIONAL DIABETES MELLITUS: ICD-10-CM

## 2022-07-12 PROCEDURE — 99214 OFFICE O/P EST MOD 30 MIN: CPT | Mod: 95 | Performed by: PHYSICIAN ASSISTANT

## 2022-07-12 NOTE — LETTER
Date:July 19, 2022      Provider requested that no letter be sent. Do not send.       [facility]

## 2022-07-12 NOTE — PATIENT INSTRUCTIONS
Congratulations on your pregnancy  - and awesome work!    Please continue to make sure to get a balanced diet including enough carbohydrates (ideally high quality carbohydrates) to support energy needs of pregnancy (generally 160 g daily.)     As long as NO low BG <55 and very rare <70, please increase NPH by 1 unit each time fasting BG exceeds 105 at all or 95 more than 1/5 days.      If 1 hour post meal BG >140 more than 1 in two days (6 post meal checks), and no daytime low BG <70, please start NPH 8 units each morning and increase by 1 unit daily to meet goal.  Please let us know when this happens.    Please call with any concerns.      My best wishes,    Jackie Macario PA-C, MPAS  [facility]  Diabetes, Endocrinology, and Metabolism  [phone] Appointments/Nurse  [phone] Fax  [phone] URGENTafter hours/weekend Endocrinologist on call

## 2022-07-12 NOTE — LETTER
2022       RE: Destinee Caba  [address] [facility] [address]     Dear Colleague,    Thank you for referring your patient, Destinee Caba, to the [facility] [place] [facility]. Please see a copy of my visit note below.    Outcome for 22 9:13 AM: Calvin message sent  TOBY De La Cruz  Outcome for 22 8:32 AM: Glucose Readings sent via Calvin  TOBY Medina      Destinee Caba  is being evaluated via a billable video visit.      How would you like to obtain your AVS? [employer]  For the video visit, send the invitation by: Send to e-mail at: [email or web address]  Will anyone else be joining your video visit? No        Diabetes Clinic  Consult Video viist  Time visit started: 1:14 AM  Time visit  Ended: 1: 42 AM  Location of patient: home  Location of provider: [facility]  Platform: Nati       Destinee Caba MRN:[id number] YOB: 1990  Primary care provider: No Ref-Primary, Physician     Reason for Endocrine consult: Hx of insulin controlled GDM and pregnancy    HPI:  Destinee Caba is a 31 year old female  , currently 22 w 4 d pregnant with MAURICIO: 22. HbA1c: 5.9 in 22.  Weight 76.5 kg    Per chart review:  Destinee has been pregnant 3 times and each gestation was noted for history of gestational DM.  She has had both diet controlled as well as insulin medicated management.     First pregnancy was a miscarriage  Second pregnancy she had a boy, had shoulder dystocia. During that pregnancy she used glyburide at night time and that was enough to keep her BG under target    Her third pregnancy at term she has used NPH 55 units at bedtime and novolog or humalog with meals upto 14 units with each meal. She has previously seen diabetes educator at OSH for carb counting and was advised to do 2-3 carb choices per meal.    For her 4 th pregnancy , she had her  OBGYN care at [place],  At 12 weeks pregnant she had her HbA1c was checked and it was at 9. She used NPH 12 units at bedtime throughout the preganncy and after diet modification her HbA1c was 5.1.    Every post partum 6 week check her BG were well controlled and she was never on any oral or injections for her blood glucoses.     Today:     Here are the 2 weeks' blood sugar readings.  I put them in the following order - date: fasting, post-breakfast, post-lunch, post-dinner.   If I put an x, I missed that reading.  With the holiday and hosting several events I did, unfortunately, miss a few readings.  I am keeping with my diet despite the holiday and hosting, but just busy things kept me from checking a few times.   6/28: 119, 104, 110, 134  6/29: 95, 96, 110, 124  6/30: 108, 149, 112, 123  7/1: 106, 139, 114, 126  7/2: 106, 126, 112, 103  7/3: 111, x, x ,x  7/4: 109, 101, 123, x  7/5: 99, 138, 136, 92  7/6: 102, 115, 128, 103  7/7: 107, 108, 117, 121  7/8: 104, 132, 99    Last 3 fasting 85, 84 90. 1 hour after meal. Thinks on June 30th was still on vacation.       As an update on my overnight insulin, I am up to 28 units.  I am increasing with 2 units every other night as per instructed in my last visit.  My nighttime routine was also out of whack with the hosting and holiday, so I am hoping to really dial in that overnight number here in the next few nights with a more regular schedule and night routine.        -   Done checking on ketones, never has any.  Doesn't feel depriving herself.  Has aversion to coffee when pregnant.  Doing carb snacks and it has helping.  She has consistently been eating as follows:  Breakfast 20 g, tries 15 g snack, Lunch 30 g carb, and 15 g snack, 30 g at dinner - bedtime snack 20 g - feel full better .  Doesn't count dairy as a carb - Counts potatoes rice, greek yogurt, would count squash. Was cutting back at breakfast, but generally isn't neccessary now with insulin.  Checking 1 hour  "after meals.  Denies any symptoms of low blood sugars. (or high) though has had both in the past.      Hoping to limit appointment as feels she knows what she is doing and will contact us if BG too high.      FH: mother, father, grandparents have  T2DM    SH: , has 3 kids, no tobacco, Limited alcohol use when not pregnant, no illicit drug use.      ROS:  All 12 systems were reviewed and negative except as mentioned in HPI    Past Medical/Surgical History:  Past Medical History:   Diagnosis Date     History of asthma     \"seasonal\"     History of gestational diabetes     \"high pre-diabetic level vs. type 2 per endocrinologist\"     Past Surgical History:   Procedure Laterality Date     WISDOM TOOTH EXTRACTION  2010       Allergies:  No Known Allergies    PTA Meds:  Prior to Admission medications    Medication Sig Last Dose Taking? Auth Provider   albuterol (PROAIR HFA/PROVENTIL HFA/VENTOLIN HFA) 108 (90 Base) MCG/ACT inhaler Inhale 1-2 puffs into the lungs   Reported, Patient   Loratadine (CLARITIN PO)    Reported, Patient   Prenatal Vit-Fe Fumarate-FA (PRENATAL MULTIVITAMIN W/IRON) 27-0.8 MG tablet Take 1 tablet by mouth daily   Reported, Patient        Current heard:   Current Outpatient Medications   Medication     albuterol (PROAIR HFA/PROVENTIL HFA/VENTOLIN HFA) 108 (90 Base) MCG/ACT inhaler     insulin  UNIT/ML injection     insulin pen needle (BD ELBETR U/F) 32G X 4 MM miscellaneous     Loratadine (CLARITIN PO)     Prenatal Vit-Fe Fumarate-FA (PRENATAL MULTIVITAMIN W/IRON) 27-0.8 MG tablet     No current facility-administered medications for this visit.       Family History:  Family History   Problem Relation Age of Onset     Obesity Mother      Diabetes Mother         type 2     Obesity Father      Diabetes Father         type 2     Asthma Brother      No Known Problems Daughter      No Known Problems Son      No Known Problems Son        Social History:  Social History     Tobacco Use     Smoking " "status: Never Smoker     Smokeless tobacco: Never Used   Substance Use Topics     Alcohol use: Not on file         Physical examination:    Wt Readings from Last 10 Encounters:   22 78.9 kg (174 lb)   22 76.5 kg (168 lb 9.6 oz)     Estimated body mass index is 27.83 kg/m  as calculated from the following:    Height as of 22: 1.684 m (5' 6.3\").    Weight as of 22: 78.9 kg (174 lb).    Exam limited due to video visit due to COVID precautions and patient convenience.  General appearance: seated comfortably in the chair during assessment. Not in any acute distress  Lungs: Speaking full sentences  Neurological: conscious and oriented. Speech: normal.   Psychiatric:  mood is \"good\" and affect warm and appropriate.  Thought form making content are fluent and coherent.  Logical questions.    Endocrine Labs:  ENDO DIABETES Latest Ref Rng & Units 2022   A1C 0.0 - 5.6 % 5.9 (H)   HCT 35.0 - 47.0 % 38.9   HGB 11.7 - 15.7 g/dL 12.7   RBC 3.80 - 5.20 10e6/uL 4.53   RDW 10.0 - 15.0 % 12.4   WBC 4.0 - 11.0 10e3/uL 6.7   MCH 26.5 - 33.0 pg 28.0   MCHC 31.5 - 36.5 g/dL 32.6   MCV 78 - 100 fL 86    - 450 10e3/uL 191     Recent Labs   Lab Test 22  1304   A1C 5.9*            Assessment and Plan:     Destinee Caba is a 31 year old female  , now 22 weeks gestation, with most postprandial blood sugars at goal on NPH insulin, though fasting blood sugars continue to be above goal.    1.  Gestational Diabetes, 22 w 4d.  Postprandial blood sugars at goal, fasting blood sugars above goal.    Destinee is encouraged in the excellent work that she is doing to manage her gestational diabetes.  Goal is 160 g daily of high-quality, high-fiber fruit vegetable whole-grain carbohydrate    Advised:  Please continue to make sure to get a balanced diet including enough carbohydrates (ideally high quality carbohydrates) to support energy needs of pregnancy (generally 160 g daily.)     As long as NO low BG " <55 and very rare <70, please increase NPH by 1 unit each time fasting BG exceeds 105 at all or 95 more than 1/5 days.      If 1 hour post meal BG >140 more than 1 in two days (6 post meal checks), and no daytime low BG <70, please start NPH 8 units each morning and increase by 1 unit daily to meet goal.  Please let us know when this happens.    In her previous pregnancy, she did require NovoLog dosing with meals and we will continue to monitor to see if this is necessary.  RTC 4 weeks as long as meeting goals, otherwise weekly to meet all blood glucose goals.      -Reviewed blood glucose goals during pregnancy  Fasting< 95 mg/dl  1 hr after meal: < 140 mg/dl  2 hr after meal< 120 mg/dl  -Recommended she check blood glucose fasting, 1 hr after each meal  -Discuss with her OBGYN if she is getting adequate carbohydrate intake     RTC in 4 weeks and follow-up with CDE and dietitian interim for adjustments    It is my privilege to be involved in the care of the above patient.     Jackie Macario PA-C, MPAS  [facility]  Diabetes, Endocrinology, and Metabolism  [phone] Appointments/Nurse  [phone] Fax  [phone] pager  [phone] nurse line    36 minutes spent on the date of the encounter doing chart review, history and exam, documentation, education and counseling, as well as communication and coordination of care, and further activities as noted above.  This time excludes time spent reviewing CGM.          Again, thank you for allowing me to participate in the care of your patient.      Sincerely,    Jackie Macario PA-C

## 2022-07-12 NOTE — NURSING NOTE
Patient denies any changes since echeck-in regarding medication and allergies and states all information entered during echeck-in remains accurate.  Cassie Santos on 7/12/2022 at 12:45 PM

## 2022-07-17 ENCOUNTER — HEALTH MAINTENANCE LETTER (OUTPATIENT)
Age: 32
End: 2022-07-17

## 2022-07-26 ENCOUNTER — PRENATAL OFFICE VISIT (OUTPATIENT)
Dept: OBGYN | Facility: OTHER | Age: 32
End: 2022-07-26
Payer: COMMERCIAL

## 2022-07-26 VITALS
HEART RATE: 114 BPM | SYSTOLIC BLOOD PRESSURE: 110 MMHG | DIASTOLIC BLOOD PRESSURE: 71 MMHG | WEIGHT: 176.1 LBS | BODY MASS INDEX: 28.17 KG/M2

## 2022-07-26 DIAGNOSIS — Z34.92 ENCNTR FOR SUPRVSN OF NORMAL PREG, UNSP, SECOND TRIMESTER: Primary | ICD-10-CM

## 2022-07-26 DIAGNOSIS — Z86.32 HISTORY OF INSULIN CONTROLLED GESTATIONAL DIABETES MELLITUS: ICD-10-CM

## 2022-07-26 PROCEDURE — 99207 PR PRENATAL VISIT: CPT | Performed by: OBSTETRICS & GYNECOLOGY

## 2022-07-26 NOTE — PROGRESS NOTES
Following glucose and seeing endo.  US normal  Feeling well  No complaints  Growth US in one month

## 2022-08-02 ENCOUNTER — MYC MEDICAL ADVICE (OUTPATIENT)
Dept: ENDOCRINOLOGY | Facility: CLINIC | Age: 32
End: 2022-08-02

## 2022-08-02 NOTE — PROGRESS NOTES
Destinee Caba  is being evaluated via a billable video visit.      How would you like to obtain your AVS? [employer]  For the video visit, send the invitation by: Text to cell phone: [phone]   Will anyone else be joining your video visit? No      Outcome for 22 9:32 AM: Glucose Readings sent via [employer]  TOBY De La Cruz          Diabetes Clinic  Consult Video viist  Time visit started: 16:21  Time visit  Ended: 16:44  Location of patient: home  Location of provider: [employer]  Platform: Nati       Destinee Caba   MRN:[id number]   YOB: 1990    Reason for Endocrine consult: Hx of insulin controlled GDM and pregnancy    HPI:  Destinee Caba is a 31 year old female  , currently 26 w 4 d pregnant with MAURICIO: 22. HbA1c: 5.9 in 22.  Weight 76.5 kg    Per chart review:  Destinee has been pregnant 3 times and each gestation was noted for history of gestational DM.  She has had both diet controlled as well as insulin medicated management.     First pregnancy was a miscarriage  Second pregnancy she had a boy, had shoulder dystocia. During that pregnancy she used glyburide at night time and that was enough to keep her BG under target    Her third pregnancy at term she has used NPH 55 units at bedtime and novolog or humalog with meals upto 14 units with each meal. She has previously seen diabetes educator at OSH for carb counting and was advised to do 2-3 carb choices per meal.    For her 4th pregnancy , she had her OBGYN care at [place],  At 12 weeks pregnant she had her HbA1c was checked and it was at 9. She used NPH 12 units at bedtime throughout the preganncy and after diet modification her HbA1c was 5.1.    Every post partum 6 week check her BG were well controlled and she was never on any oral or injections for her blood glucoses.     Today:   She is now 28 weeks in the 5th gestation.    Increased her night time insulin to 35, three days ago and now all  "fastings <90 and p breakfast meals are better.  Moved snack back to 8 pm.  Meals look good.  She has spread out - she found numer better with bigger snacks and smaller meals - able to get lower numbers.  She is pretty active and continues to hike and walk as well as take care of her chickens, do some gardening and play with her children on their acreage.  She is getting several hours of activity every day.  She has good energy.  She has a well-balanced diet with at least 4 vegetables daily and multiple servings of fruit as well together with a variety of proteins and grains.  She is getting up earlier than she was previously.     7/24: 126, 114, 121, 139  7/25: 112, 129, 140, 105  7/26: 98, 126, 133, 109  7/27: 108 (late dinner and late snack), 122, 124, 108  7/28: 108, 140, 122, 113  7/29: 113, 107, 112, 121  7/30: 121, 115, 103, 120  8/1: 89, 121, 100, 134  8/2: 92, 134, 118, 130  8/3: 95, 138, 111, 135  8/4: 101, 126, 104, 129  8/5: 110, 121, 132    1 h post meal    She has not been checking ketones that she has not had them in the past and she is getting plenty of carbohydrate.    She continues to feel comfortable with appointments every month and reaching out if her numbers are above goal.    She has an ultrasound scheduled later this week.  14-week ultrasound in June was reassuring. (Reviewed by myself.)      FH: mother, father, grandparents have  T2DM. Mom was diagnosed 5-6 years ago with type 2 diabetes at 51 yo.  Dad diagnosed at 60 some.     SH: , has 3 kids, no tobacco, Limited alcohol use when not pregnant, no alcohol since pregnant , no illicit drug use.      ROS:  All 12 systems were reviewed and negative except as mentioned in HPI.  Specifically she denies any difficulties with headache vision muscle aches or cramping chest pain or shortness of breath, vomiting or weakness.    Past Medical/Surgical History:  Past Medical History:   Diagnosis Date     History of asthma     \"seasonal\"     History " "of gestational diabetes     \"high pre-diabetic level vs. type 2 per endocrinologist\"     Past Surgical History:   Procedure Laterality Date     WISDOM TOOTH EXTRACTION  2010       Allergies:  No Known Allergies    PTA Meds:  Prior to Admission medications    Medication Sig Last Dose Taking? Auth Provider   albuterol (PROAIR HFA/PROVENTIL HFA/VENTOLIN HFA) 108 (90 Base) MCG/ACT inhaler Inhale 1-2 puffs into the lungs   Reported, Patient   Loratadine (CLARITIN PO)    Reported, Patient   Prenatal Vit-Fe Fumarate-FA (PRENATAL MULTIVITAMIN W/IRON) 27-0.8 MG tablet Take 1 tablet by mouth daily   Reported, Patient        Current heard:   Current Outpatient Medications   Medication     albuterol (PROAIR HFA/PROVENTIL HFA/VENTOLIN HFA) 108 (90 Base) MCG/ACT inhaler     insulin  UNIT/ML injection     insulin pen needle (BD ELBERT U/F) 32G X 4 MM miscellaneous     Loratadine (CLARITIN PO)     Prenatal Vit-Fe Fumarate-FA (PRENATAL MULTIVITAMIN W/IRON) 27-0.8 MG tablet     No current facility-administered medications for this visit.       Family History:  Family History   Problem Relation Age of Onset     Obesity Mother      Diabetes Mother         type 2     Obesity Father      Diabetes Father         type 2     Asthma Brother      No Known Problems Daughter      No Known Problems Son      No Known Problems Son        Social History:  Social History     Tobacco Use     Smoking status: Never Smoker     Smokeless tobacco: Never Used   Substance Use Topics     Alcohol use: Not on file         Physical examination:    Wt Readings from Last 10 Encounters:   07/26/22 79.9 kg (176 lb 1.6 oz)   06/29/22 78.9 kg (174 lb)   05/23/22 76.5 kg (168 lb 9.6 oz)     Estimated body mass index is 28.17 kg/m  as calculated from the following:    Height as of 5/23/22: 1.684 m (5' 6.3\").    Weight as of 7/26/22: 79.9 kg (176 lb 1.6 oz).    Exam limited due to video visit due to COVID precautions and patient convenience.  General appearance: " "seated comfortably in the chair during assessment. Not in any acute distress  Lungs: Speaking full sentences  Neurological: conscious and oriented. Speech: normal.   Psychiatric:  mood is \"good\" and affect warm and appropriate.  Thought form making content are fluent and coherent.  Logical questions.    Endocrine Labs:  ENDO DIABETES Latest Ref Rng & Units 2022   A1C 0.0 - 5.6 % 5.9 (H)   HCT 35.0 - 47.0 % 38.9   HGB 11.7 - 15.7 g/dL 12.7   RBC 3.80 - 5.20 10e6/uL 4.53   RDW 10.0 - 15.0 % 12.4   WBC 4.0 - 11.0 10e3/uL 6.7   MCH 26.5 - 33.0 pg 28.0   MCHC 31.5 - 36.5 g/dL 32.6   MCV 78 - 100 fL 86    - 450 10e3/uL 191     Recent Labs   Lab Test 22  1304   A1C 5.9*            Assessment and Plan:     Destinee Caba is a 31 year old female  , now 28 weeks gestation, with most postprandial blood sugars at goal on NPH insulin, though fasting blood sugars continue to be above goal.    1.  Gestational Diabetes, 22 w 4d.  Postprandial blood sugars at goal, fasting blood sugars above goal.    Destinee is again encouraged in the excellent work that she is doing to manage her gestational diabetes.  She is doing a great job of eating high-quality carbohydrates together with a variety of vegetables protein and fats.    Discussed that insulin needs to continue to increase until somewhere between 32 and 36 weeks and then should stabilize and may decrease it very slightly but should not drop rapidly.    Advised:  You are a !  Keep up your great work.  I am very happy that you are able to remain active.  If you have not yet, check with your OB or trusted source on safe weight lifting in pregnancy.      Again, if not meeting fasting BG goal of <95  2 or more days /week or any >110, please increase NPH dose by 2 units as long as does not lead to any BG <70.    If you are seeing 5 or more post prandial BG above goal /week, please start 6 units NPH when you awake or at least 30 minutes before " breakfast and as long as no BG <70, increase by 2 units/day until post most meal BG are at goal.      RTC in 4 weeks and follow-up with CDE and dietitian interim for adjustments    It is my privilege to be involved in the care of the above patient.     Jackie Macario PA-C, MPAS  [facility]  Diabetes, Endocrinology, and Metabolism  [phone] Appointments/Nurse  [phone] Fax  [phone] pager  [phone] nurse line    25 minutes spent on the date of the encounter doing chart review, history and exam, documentation, education and counseling, as well as communication and coordination of care, and further activities as noted above.  This time excludes time spent reviewing CGM.

## 2022-08-05 ENCOUNTER — TELEPHONE (OUTPATIENT)
Dept: ENDOCRINOLOGY | Facility: CLINIC | Age: 32
End: 2022-08-05

## 2022-08-05 NOTE — TELEPHONE ENCOUNTER
Attempted to reach patient for upcoming appointment, data needed for blood glucose readings. No answer, LM on  to call office back.     Appointment with Jackie Macario on 08/09/2022

## 2022-08-09 ENCOUNTER — VIRTUAL VISIT (OUTPATIENT)
Dept: ENDOCRINOLOGY | Facility: CLINIC | Age: 32
End: 2022-08-09
Payer: COMMERCIAL

## 2022-08-09 DIAGNOSIS — O24.414 INSULIN CONTROLLED GESTATIONAL DIABETES MELLITUS (GDM) DURING PREGNANCY, ANTEPARTUM: Primary | ICD-10-CM

## 2022-08-09 PROCEDURE — 99213 OFFICE O/P EST LOW 20 MIN: CPT | Mod: 95 | Performed by: PHYSICIAN ASSISTANT

## 2022-08-09 NOTE — LETTER
Date:September 3, 2022      Provider requested that no letter be sent. Do not send.       [facility]

## 2022-08-09 NOTE — LETTER
2022       RE: Destinee Caba  [address] [facility] [address]     Dear Colleague,    Thank you for referring your patient, Destinee Caba, to the [facility] [place] at [facility]. Please see a copy of my visit note below.    Destinee Caba  is being evaluated via a billable video visit.      How would you like to obtain your AVS? [employer]  For the video visit, send the invitation by: Text to cell phone: [phone]   Will anyone else be joining your video visit? No      Outcome for 22 9:32 AM: Glucose Readings sent via [employer]  TOBY De La Cruz          Diabetes Clinic  Consult Video viist  Time visit started: 16:21  Time visit  Ended: 16:44  Location of patient: home  Location of provider: [employer]  Platform: [employer]       Destinee Caba   MRN:[id number]   YOB: 1990    Reason for Endocrine consult: Hx of insulin controlled GDM and pregnancy    HPI:  Destinee Caba is a 31 year old female  , currently 26 w 4 d pregnant with MAURICIO: 22. HbA1c: 5.9 in 22.  Weight 76.5 kg    Per chart review:  Destinee has been pregnant 3 times and each gestation was noted for history of gestational DM.  She has had both diet controlled as well as insulin medicated management.     First pregnancy was a miscarriage  Second pregnancy she had a boy, had shoulder dystocia. During that pregnancy she used glyburide at night time and that was enough to keep her BG under target    Her third pregnancy at term she has used NPH 55 units at bedtime and novolog or humalog with meals upto 14 units with each meal. She has previously seen diabetes educator at OSH for carb counting and was advised to do 2-3 carb choices per meal.    For her 4th pregnancy , she had her OBGYN care at [place],  At 12 weeks pregnant she had her HbA1c was checked and it was at 9. She used NPH 12 units at bedtime  throughout the preganncy and after diet modification her HbA1c was 5.1.    Every post partum 6 week check her BG were well controlled and she was never on any oral or injections for her blood glucoses.     Today:   She is now 28 weeks in the 5th gestation.    Increased her night time insulin to 35, three days ago and now all fastings <90 and p breakfast meals are better.  Moved snack back to 8 pm.  Meals look good.  She has spread out - she found numer better with bigger snacks and smaller meals - able to get lower numbers.  She is pretty active and continues to hike and walk as well as take care of her chickens, do some gardening and play with her children on their acreage.  She is getting several hours of activity every day.  She has good energy.  She has a well-balanced diet with at least 4 vegetables daily and multiple servings of fruit as well together with a variety of proteins and grains.  She is getting up earlier than she was previously.     7/24: 126, 114, 121, 139  7/25: 112, 129, 140, 105  7/26: 98, 126, 133, 109  7/27: 108 (late dinner and late snack), 122, 124, 108  7/28: 108, 140, 122, 113  7/29: 113, 107, 112, 121  7/30: 121, 115, 103, 120  8/1: 89, 121, 100, 134  8/2: 92, 134, 118, 130  8/3: 95, 138, 111, 135  8/4: 101, 126, 104, 129  8/5: 110, 121, 132    1 h post meal    She has not been checking ketones that she has not had them in the past and she is getting plenty of carbohydrate.    She continues to feel comfortable with appointments every month and reaching out if her numbers are above goal.    She has an ultrasound scheduled later this week.  14-week ultrasound in June was reassuring. (Reviewed by myself.)      FH: mother, father, grandparents have  T2DM. Mom was diagnosed 5-6 years ago with type 2 diabetes at 51 yo.  Dad diagnosed at 60 some.     SH: , has 3 kids, no tobacco, Limited alcohol use when not pregnant, no alcohol since pregnant , no illicit drug use.      ROS:  All 12  "systems were reviewed and negative except as mentioned in HPI.  Specifically she denies any difficulties with headache vision muscle aches or cramping chest pain or shortness of breath, vomiting or weakness.    Past Medical/Surgical History:  Past Medical History:   Diagnosis Date     History of asthma     \"seasonal\"     History of gestational diabetes     \"high pre-diabetic level vs. type 2 per endocrinologist\"     Past Surgical History:   Procedure Laterality Date     WISDOM TOOTH EXTRACTION  2010       Allergies:  No Known Allergies    PTA Meds:  Prior to Admission medications    Medication Sig Last Dose Taking? Auth Provider   albuterol (PROAIR HFA/PROVENTIL HFA/VENTOLIN HFA) 108 (90 Base) MCG/ACT inhaler Inhale 1-2 puffs into the lungs   Reported, Patient   Loratadine (CLARITIN PO)    Reported, Patient   Prenatal Vit-Fe Fumarate-FA (PRENATAL MULTIVITAMIN W/IRON) 27-0.8 MG tablet Take 1 tablet by mouth daily   Reported, Patient        Current heard:   Current Outpatient Medications   Medication     albuterol (PROAIR HFA/PROVENTIL HFA/VENTOLIN HFA) 108 (90 Base) MCG/ACT inhaler     insulin  UNIT/ML injection     insulin pen needle (BD ELBERT U/F) 32G X 4 MM miscellaneous     Loratadine (CLARITIN PO)     Prenatal Vit-Fe Fumarate-FA (PRENATAL MULTIVITAMIN W/IRON) 27-0.8 MG tablet     No current facility-administered medications for this visit.       Family History:  Family History   Problem Relation Age of Onset     Obesity Mother      Diabetes Mother         type 2     Obesity Father      Diabetes Father         type 2     Asthma Brother      No Known Problems Daughter      No Known Problems Son      No Known Problems Son        Social History:  Social History     Tobacco Use     Smoking status: Never Smoker     Smokeless tobacco: Never Used   Substance Use Topics     Alcohol use: Not on file         Physical examination:    Wt Readings from Last 10 Encounters:   07/26/22 79.9 kg (176 lb 1.6 oz)   06/29/22 " "78.9 kg (174 lb)   22 76.5 kg (168 lb 9.6 oz)     Estimated body mass index is 28.17 kg/m  as calculated from the following:    Height as of 22: 1.684 m (5' 6.3\").    Weight as of 22: 79.9 kg (176 lb 1.6 oz).    Exam limited due to video visit due to COVID precautions and patient convenience.  General appearance: seated comfortably in the chair during assessment. Not in any acute distress  Lungs: Speaking full sentences  Neurological: conscious and oriented. Speech: normal.   Psychiatric:  mood is \"good\" and affect warm and appropriate.  Thought form making content are fluent and coherent.  Logical questions.    Endocrine Labs:  ENDO DIABETES Latest Ref Rng & Units 2022   A1C 0.0 - 5.6 % 5.9 (H)   HCT 35.0 - 47.0 % 38.9   HGB 11.7 - 15.7 g/dL 12.7   RBC 3.80 - 5.20 10e6/uL 4.53   RDW 10.0 - 15.0 % 12.4   WBC 4.0 - 11.0 10e3/uL 6.7   MCH 26.5 - 33.0 pg 28.0   MCHC 31.5 - 36.5 g/dL 32.6   MCV 78 - 100 fL 86    - 450 10e3/uL 191     Recent Labs   Lab Test 22  1304   A1C 5.9*            Assessment and Plan:     Destinee Caba is a 31 year old female  , now 28 weeks gestation, with most postprandial blood sugars at goal on NPH insulin, though fasting blood sugars continue to be above goal.    1.  Gestational Diabetes, 22 w 4d.  Postprandial blood sugars at goal, fasting blood sugars above goal.    Destinee is again encouraged in the excellent work that she is doing to manage her gestational diabetes.  She is doing a great job of eating high-quality carbohydrates together with a variety of vegetables protein and fats.    Discussed that insulin needs to continue to increase until somewhere between 32 and 36 weeks and then should stabilize and may decrease it very slightly but should not drop rapidly.    Advised:  You are a !  Keep up your great work.  I am very happy that you are able to remain active.  If you have not yet, check with your OB or trusted source on safe " weight lifting in pregnancy.      Again, if not meeting fasting BG goal of <95  2 or more days /week or any >110, please increase NPH dose by 2 units as long as does not lead to any BG <70.    If you are seeing 5 or more post prandial BG above goal /week, please start 6 units NPH when you awake or at least 30 minutes before breakfast and as long as no BG <70, increase by 2 units/day until post most meal BG are at goal.      RTC in 4 weeks and follow-up with CDE and dietitian interim for adjustments    It is my privilege to be involved in the care of the above patient.     Jackie Macario PA-C, [facility]  [facility]  Diabetes, Endocrinology, and Metabolism  [phone] Appointments/Nurse  [phone] Fax  [phone] pager  [phone] nurse line    25 minutes spent on the date of the encounter doing chart review, history and exam, documentation, education and counseling, as well as communication and coordination of care, and further activities as noted above.  This time excludes time spent reviewing CGM.          Again, thank you for allowing me to participate in the care of your patient.      Sincerely,    Jackie Macario PA-C

## 2022-08-09 NOTE — PATIENT INSTRUCTIONS
You are a !  Keep up your great work.  I am very happy that you are able to remain active.  If you have not yet, check with your OB or trusted source on safe weight lifting in pregnancy.      Again, if not meeting fasting BG goal of <95  2 or more days /week or any >110, please increase NPH dose by 2 units as long as does not lead to any BG <70.    If you are seeing 5 or more post prandial BG above goal /week, please start 6 units NPH when you awake or at least 30 minutes before breakfast and as long as no BG <70, increase by 2 units/day until post most meal BG are at goal.      My best wishes,    Jackie Macario PA-C, MPAS  [facility]  Diabetes, Endocrinology, and Metabolism  [phone] Appointments/Nurse  [phone] Fax  [phone] URGENTafter hours/weekend Endocrinologist on call

## 2022-08-11 ENCOUNTER — HOSPITAL ENCOUNTER (OUTPATIENT)
Dept: ULTRASOUND IMAGING | Facility: CLINIC | Age: 32
Discharge: HOME | End: 2022-08-11
Attending: OBSTETRICS & GYNECOLOGY | Admitting: OBSTETRICS & GYNECOLOGY
Payer: COMMERCIAL

## 2022-08-11 DIAGNOSIS — Z86.32 HISTORY OF INSULIN CONTROLLED GESTATIONAL DIABETES MELLITUS: ICD-10-CM

## 2022-08-11 DIAGNOSIS — Z34.92 ENCNTR FOR SUPRVSN OF NORMAL PREG, UNSP, SECOND TRIMESTER: ICD-10-CM

## 2022-08-11 PROCEDURE — 76805 OB US >/= 14 WKS SNGL FETUS: CPT

## 2022-08-15 ENCOUNTER — PRENATAL OFFICE VISIT (OUTPATIENT)
Dept: OBGYN | Facility: CLINIC | Age: 32
End: 2022-08-15
Payer: COMMERCIAL

## 2022-08-15 VITALS
OXYGEN SATURATION: 98 % | WEIGHT: 177.3 LBS | SYSTOLIC BLOOD PRESSURE: 98 MMHG | BODY MASS INDEX: 28.36 KG/M2 | HEART RATE: 100 BPM | DIASTOLIC BLOOD PRESSURE: 68 MMHG

## 2022-08-15 DIAGNOSIS — Z34.92 ENCNTR FOR SUPRVSN OF NORMAL PREG, UNSP, SECOND TRIMESTER: Primary | ICD-10-CM

## 2022-08-15 PROCEDURE — 99207 PR PRENATAL VISIT: CPT | Performed by: OBSTETRICS & GYNECOLOGY

## 2022-08-23 NOTE — PATIENT INSTRUCTIONS
If you have any questions regarding your visit, Please contact your care team.     [employer]: [phone]    To Schedule an Appointment 24/7  Call: [phone]LEPHONE [facility]     Flaquito Hester MD    Medical Assistant    Reanna Muir-Surgery Scheduler  Abbi-Surgery Scheduler     Monday-[place]  1:00 pm-4:30 pm    Tuesday-[place]  7:30 am-4:30 pm    Wednesday-[place]  7:30 am-4:30 pm        Typical Surgery Days: Monday or Thursday       [address] [address] [phone] appointment line  [phone] Fax    Imaging Scheduling all locations  [phone]    **Surgeries** Our Surgery Schedulers will contact you to schedule. If you do not receive a call within 3 business days, please call [phone].    If you need a medication refill, please contact your pharmacy. Please allow 3 business days for your refill to be completed.    As always, Thank you for trusting us with your healthcare needs!                   see additional instructions from your care team below

## 2022-08-29 ENCOUNTER — PRENATAL OFFICE VISIT (OUTPATIENT)
Dept: OBGYN | Facility: CLINIC | Age: 32
End: 2022-08-29
Payer: COMMERCIAL

## 2022-08-29 VITALS — SYSTOLIC BLOOD PRESSURE: 116 MMHG | WEIGHT: 177 LBS | BODY MASS INDEX: 28.31 KG/M2 | DIASTOLIC BLOOD PRESSURE: 64 MMHG

## 2022-08-29 DIAGNOSIS — Z86.32 HISTORY OF INSULIN CONTROLLED GESTATIONAL DIABETES MELLITUS: ICD-10-CM

## 2022-08-29 DIAGNOSIS — Z34.92 ENCNTR FOR SUPRVSN OF NORMAL PREG, UNSP, SECOND TRIMESTER: Primary | ICD-10-CM

## 2022-08-29 PROCEDURE — 99207 PR PRENATAL VISIT: CPT | Performed by: OBSTETRICS & GYNECOLOGY

## 2022-08-29 NOTE — PROGRESS NOTES
No new concerns  Baby active  Growth US shows 70 %ile  On Insulin  Vertex today  Plan for growth US at 34 weeks  Delivery at 39 weeks

## 2022-09-07 ENCOUNTER — MYC MEDICAL ADVICE (OUTPATIENT)
Dept: ENDOCRINOLOGY | Facility: CLINIC | Age: 32
End: 2022-09-07

## 2022-09-07 NOTE — PROGRESS NOTES
Destinee is a 31 year old who is being evaluated via a billable video visit.      How would you like to obtain your AVS? [employer]  If the video visit is dropped, the invitation should be resent by: Send to e-mail at: [email or web address]  Will anyone else be joining your video visit? No        Outcome for 22 1:53 PM: [email or web address] message sent  TOBY De La Cruz  Outcome for 22 11:09 AM: Glucose Readings sent via [email or web address]  TOBY De La Cruz    Destinee Caba  is being evaluated via a billable video visit.      How would you like to obtain your AVS? [employer]  For the video visit, send the invitation by: Text to cell phone: [phone]   Will anyone else be joining your video visit? No      Outcome for 22 9:32 AM: Glucose Readings sent via [email or web address]  TOBY De La Cruz          Diabetes Clinic  Consult Video viist  Time visit started: 13: 41  Time visit  Ended: 13: 59  Location of patient: home  [facility]  Platform: Nati       Destinee Caba   MRN:[id number]   YOB: 1990    Reason for Endocrine consult: Hx of insulin controlled GDM and pregnancy    HPI:  Destinee aCba is a 31 year old female  , currently 31 w 4 d pregnant with MAURICIO: 22. HbA1c: 5.9 in 22.  Weight 76.5 kg    Per chart review:  Destinee has been pregnant 3 times and each gestation was noted for history of gestational DM.  She has had both diet controlled as well as insulin medicated management.     First pregnancy was a miscarriage  Second pregnancy she had a boy, had shoulder dystocia. During that pregnancy she used glyburide at night time and that was enough to keep her BG under target    Her third pregnancy at term she has used NPH 55 units at bedtime and novolog or humalog with meals upto 14 units with each meal. She has previously seen diabetes educator at OSH for carb counting and was advised to do 2-3 carb choices per meal.    For her 4th pregnancy , she  had her OBGYN care at [place],  At 12 weeks pregnant she had her HbA1c was checked and it was at 9. She used NPH 12 units at bedtime throughout the preganncy and after diet modification her HbA1c was 5.1.    Every post partum 6 week check her BG were well controlled and she was never on any oral or injections for her blood glucoses.     Today:   She is now 31 weeks in the 5th gestation.  She has a BPP and growth US coming up.  She notes that she has gone into labor 12 - 24 hours prior to scheduled induction.  Cervical checks planned at 37 weeks.  Feeling good; tired and pregnant.    Now has last snack around 8 pm - a big snack.  Not eating much at dinner.  Takes pm insulin at 10:30 and getting good numbers, but now can't stay up that late.  Has fallen asleep before planned, but spouse reliably getting her up to give insulin at 10:30.  Snack at bedtime sprouted wheat with eggs and butter.        numbers from the last 2 weeks, in the following order - date: fasting, 1 hour post breakfast, 1 hour post lunch, 1 hour post dinner.  If there are x's those are times where I missed taking my sugar because I had forgotten my meter at home.       8/26: 91, 123, 130, 100  8/27: 89, 110, x, x   8/28: 87, 111, 134, 135  8/29: 91, 119, 129, 129  8/30: 90, 115, 135, 131  8/31: 93, 120, 138, 135  9/1: 88, 113, 122, 121  9/2: 89, 121, 137, 128  9/3: 89, 112, 124, 126  9/4: 90, 119, 133, 136  9/5: 91, 118, 131, 139  9/6: 92, 116, 129, 134  9/7: 88, 119, 112, 132  9/8: 86, 120, 122, 130  9/9: 90, 115, 128, 125    1 h post meal    Current DM medications:  38 units qhs.        She has not been checking ketones that she has not had them in the past and she is getting plenty of carbohydrate.    She continues to feel comfortable with appointments every month and reaching out if her numbers are above goal.    She has an ultrasound scheduled later this week.  14-week ultrasound in June was reassuring. (Reviewed by myself.)      FH: mother,  "father, grandparents have  T2DM. Mom was diagnosed 5-6 years ago with type 2 diabetes at 49 yo.  Dad diagnosed at 60 some.     SH: , has 3 kids, no tobacco, Limited alcohol use when not pregnant, no alcohol since pregnant , no illicit drug use.      ROS:  All 12 systems were reviewed and negative except as mentioned in HPI.  Specifically she denies any difficulties with headache vision muscle aches or cramping chest pain or shortness of breath, vomiting or weakness.    Past Medical/Surgical History:  Past Medical History:   Diagnosis Date     History of asthma     \"seasonal\"     History of gestational diabetes     \"high pre-diabetic level vs. type 2 per endocrinologist\"     Past Surgical History:   Procedure Laterality Date     WISDOM TOOTH EXTRACTION  2010       Allergies:  No Known Allergies    PTA Meds:  Prior to Admission medications    Medication Sig Last Dose Taking? Auth Provider   albuterol (PROAIR HFA/PROVENTIL HFA/VENTOLIN HFA) 108 (90 Base) MCG/ACT inhaler Inhale 1-2 puffs into the lungs   Reported, Patient   Loratadine (CLARITIN PO)    Reported, Patient   Prenatal Vit-Fe Fumarate-FA (PRENATAL MULTIVITAMIN W/IRON) 27-0.8 MG tablet Take 1 tablet by mouth daily   Reported, Patient        Current heard:   Current Outpatient Medications   Medication     albuterol (PROAIR HFA/PROVENTIL HFA/VENTOLIN HFA) 108 (90 Base) MCG/ACT inhaler     insulin  UNIT/ML injection     insulin pen needle (BD ELBERT U/F) 32G X 4 MM miscellaneous     Loratadine (CLARITIN PO)     Prenatal Vit-Fe Fumarate-FA (PRENATAL MULTIVITAMIN W/IRON) 27-0.8 MG tablet     No current facility-administered medications for this visit.       Family History:  Family History   Problem Relation Age of Onset     Obesity Mother      Diabetes Mother         type 2     Obesity Father      Diabetes Father         type 2     Asthma Brother      No Known Problems Daughter      No Known Problems Son      No Known Problems Son        Social " "History:  Social History     Tobacco Use     Smoking status: Never Smoker     Smokeless tobacco: Never Used   Substance Use Topics     Alcohol use: Not Currently         Physical examination:    Wt Readings from Last 10 Encounters:   22 81.4 kg (179 lb 8 oz)   22 80.3 kg (177 lb)   08/15/22 80.4 kg (177 lb 4.8 oz)   22 79.9 kg (176 lb 1.6 oz)   22 78.9 kg (174 lb)   22 76.5 kg (168 lb 9.6 oz)     Estimated body mass index is 28.71 kg/m  as calculated from the following:    Height as of 22: 1.684 m (5' 6.3\").    Weight as of an earlier encounter on 22: 81.4 kg (179 lb 8 oz).    Exam limited due to video visit due to COVID precautions and patient convenience.  General appearance: seated comfortably in the chair during assessment. Not in any acute distress  Lungs: Speaking full sentences  Neurological: conscious and oriented. Speech: normal.   Psychiatric:  mood is \"good\" and affect warm and appropriate.  Thought form making content are fluent and coherent.  Logical questions.    Endocrine Labs:  ENDO DIABETES Latest Ref Rng & Units 2022   A1C 0.0 - 5.6 % 5.9 (H)   HCT 35.0 - 47.0 % 38.9   HGB 11.7 - 15.7 g/dL 12.7   RBC 3.80 - 5.20 10e6/uL 4.53   RDW 10.0 - 15.0 % 12.4   WBC 4.0 - 11.0 10e3/uL 6.7   MCH 26.5 - 33.0 pg 28.0   MCHC 31.5 - 36.5 g/dL 32.6   MCV 78 - 100 fL 86    - 450 10e3/uL 191     Recent Labs   Lab Test 22  1304   A1C 5.9*            Assessment and Plan:     Destinee Caba is a 31 year old female  , now 28 weeks gestation, with most postprandial blood sugars at goal on NPH insulin, though fasting blood sugars continue to be above goal.    1.  Gestational Diabetes, 22 w 4d.  Postprandial blood sugars at goal, fasting blood sugars above goal.    Destinee is again encouraged in the excellent work that she is doing to manage her gestational diabetes.    She is however having a hard time staying up until 1030 to get her evening dose.  I " think that it would be fine to give this at 10 PM, or even 930 if needed, but if she notices blood sugars getting low below goal when awaking and or above goal after dinner with this change, we will need to move some of the dose to her breakfast.  I would recommend 32 units at bedtime and 6 units with breakfast to begin.    Currently planning for induction of labor at 39 weeks.  With this I generally suggest to give 50% of her evening dose the night prior to reduction, and forego any morning dose.  She may need insulin drip versus continuous sliding scale while in labor to keep blood sugars at goal.    RTC in 4 weeks and follow-up with CDE and dietitian interim for adjustments    It is my privilege to be involved in the care of the above patient.     Jackie Macario PA-C, MPAS  [facility]  Diabetes, Endocrinology, and Metabolism  [phone] Appointments/Nurse  [phone] Fax  [phone] pager  [phone] nurse line    26 minutes spent on the date of the encounter doing chart review, history and exam, documentation, education and counseling, as well as communication and coordination of care, and further activities as noted above.  This time excludes time spent reviewing CGM.

## 2022-09-12 NOTE — PATIENT INSTRUCTIONS
If you have any questions regarding your visit, Please contact your care team.     [facility]: [phone]    To Schedule an Appointment 24/7  [facility]: [phone]     Flaquito Hester MD    Medical Assistant    Reanna Muir-Surgery Scheduler  Abbi-Surgery Scheduler     Monday-[place]  1:00 pm-4:30 pm    Tuesday-[place]  7:30 am-4:30 pm    Wednesday-[place]  7:30 am-4:30 pm        Typical Surgery Days: Monday or Thursday       [address] [id number] [phone] appointment line  [phone] Fax    Imaging Scheduling all locations  [phone]    **Surgeries** Our Surgery Schedulers will contact you to schedule. If you do not receive a call within 3 business days, please call [phone].    If you need a medication refill, please contact your pharmacy. Please allow 3 business days for your refill to be completed.    As always, Thank you for trusting us with your healthcare needs!                   see additional instructions from your care team below

## 2022-09-13 ENCOUNTER — VIRTUAL VISIT (OUTPATIENT)
Dept: ENDOCRINOLOGY | Facility: CLINIC | Age: 32
End: 2022-09-13
Payer: COMMERCIAL

## 2022-09-13 ENCOUNTER — PRENATAL OFFICE VISIT (OUTPATIENT)
Dept: OBGYN | Facility: OTHER | Age: 32
End: 2022-09-13
Payer: COMMERCIAL

## 2022-09-13 VITALS
HEART RATE: 86 BPM | WEIGHT: 179.5 LBS | BODY MASS INDEX: 28.71 KG/M2 | DIASTOLIC BLOOD PRESSURE: 71 MMHG | SYSTOLIC BLOOD PRESSURE: 105 MMHG

## 2022-09-13 DIAGNOSIS — Z86.32 HISTORY OF INSULIN CONTROLLED GESTATIONAL DIABETES MELLITUS: Primary | ICD-10-CM

## 2022-09-13 DIAGNOSIS — O24.414 INSULIN CONTROLLED GESTATIONAL DIABETES MELLITUS (GDM) DURING PREGNANCY, ANTEPARTUM: Primary | ICD-10-CM

## 2022-09-13 PROCEDURE — 99213 OFFICE O/P EST LOW 20 MIN: CPT | Mod: 95 | Performed by: PHYSICIAN ASSISTANT

## 2022-09-13 PROCEDURE — 99207 PR PRENATAL VISIT: CPT | Performed by: OBSTETRICS & GYNECOLOGY

## 2022-09-13 NOTE — PROGRESS NOTES
Doing well  Growth US for 32+ weeks  BPP and NST to start next 2 weeks  On Insulin for gest dm  Delivery at 39 weeks

## 2022-09-13 NOTE — LETTER
Date:September 14, 2022      Provider requested that no letter be sent. Do not send.       [facility]

## 2022-09-13 NOTE — PATIENT INSTRUCTIONS
Dear Destinee,  Keep up your great work.  I think your blood sugars will continue to be good if your dose is moved to 10 pm or possibly even 930 pm.  If needed to avoid missing the evening dose, please watch to assure no low BG in the early morning and or BG above goal after dinner.  If this occurs, I advise that you reduce evening dose by 15% and give the 15% in the morning with your breakfast. (I.e. 32 units at bedtime and 6 units with breakfast.)    Let me know of any concerns.    My best wishes,    Jackie Macario PA-C, MPAS  [facility]  Diabetes, Endocrinology, and Metabolism  [phone] Appointments/Nurse  [phone] Fax  [phone] URGENTafter hours/weekend Endocrinologist on call

## 2022-09-13 NOTE — LETTER
2022       RE: Destinee Caba  [address] [facility] [address]     Dear Colleague,    Thank you for referring your patient, Destinee Caba, to the [facility] ENDOCRINOLOGY CLINIC [place] at [facility]. Please see a copy of my visit note below.    Destinee is a 31 year old who is being evaluated via a billable video visit.      How would you like to obtain your AVS? [facility]  If the video visit is dropped, the invitation should be resent by: Send to e-mail at: [email or web address]  Will anyone else be joining your video visit? No        Outcome for 22 1:53 PM: [employer] message sent  TOBY De La Cruz  Outcome for 22 11:09 AM: Glucose Readings sent via TOBY Leal    Destinee Caba  is being evaluated via a billable video visit.      How would you like to obtain your AVS? Ana M  For the video visit, send the invitation by: Text to cell phone: [phone]   Will anyone else be joining your video visit? No      Outcome for 22 9:32 AM: Glucose Readings sent via TOBY Leal          Diabetes Clinic  Consult Video viist  Time visit started: 13: 41  Time visit  Ended: 13: 59  Location of patient: home  [facility]  Platform: Nati       Destinee Caba   MRN:[id number]   YOB: 1990    Reason for Endocrine consult: Hx of insulin controlled GDM and pregnancy    HPI:  Destinee Caba is a 31 year old female  , currently 31 w 4 d pregnant with MAURICIO: 22. HbA1c: 5.9 in 22.  Weight 76.5 kg    Per chart review:  Destinee has been pregnant 3 times and each gestation was noted for history of gestational DM.  She has had both diet controlled as well as insulin medicated management.     First pregnancy was a miscarriage  Second pregnancy she had a boy, had shoulder dystocia. During that pregnancy she used glyburide at night time and  that was enough to keep her BG under target    Her third pregnancy at term she has used NPH 55 units at bedtime and novolog or humalog with meals upto 14 units with each meal. She has previously seen diabetes educator at OSH for carb counting and was advised to do 2-3 carb choices per meal.    For her 4th pregnancy , she had her OBGYN care at [place],  At 12 weeks pregnant she had her HbA1c was checked and it was at 9. She used NPH 12 units at bedtime throughout the preganncy and after diet modification her HbA1c was 5.1.    Every post partum 6 week check her BG were well controlled and she was never on any oral or injections for her blood glucoses.     Today:   She is now 31 weeks in the 5th gestation.  She has a BPP and growth US coming up.  She notes that she has gone into labor 12 - 24 hours prior to scheduled induction.  Cervical checks planned at 37 weeks.  Feeling good; tired and pregnant.    Now has last snack around 8 pm - a big snack.  Not eating much at dinner.  Takes pm insulin at 10:30 and getting good numbers, but now can't stay up that late.  Has fallen asleep before planned, but spouse reliably getting her up to give insulin at 10:30.  Snack at bedtime sprouted wheat with eggs and butter.        numbers from the last 2 weeks, in the following order - date: fasting, 1 hour post breakfast, 1 hour post lunch, 1 hour post dinner.  If there are x's those are times where I missed taking my sugar because I had forgotten my meter at home.       8/26: 91, 123, 130, 100  8/27: 89, 110, x, x   8/28: 87, 111, 134, 135  8/29: 91, 119, 129, 129  8/30: 90, 115, 135, 131  8/31: 93, 120, 138, 135  9/1: 88, 113, 122, 121  9/2: 89, 121, 137, 128  9/3: 89, 112, 124, 126  9/4: 90, 119, 133, 136  9/5: 91, 118, 131, 139  9/6: 92, 116, 129, 134  9/7: 88, 119, 112, 132  9/8: 86, 120, 122, 130  9/9: 90, 115, 128, 125    1 h post meal    Current DM medications:  38 units qhs.        She has not been checking ketones that she  "has not had them in the past and she is getting plenty of carbohydrate.    She continues to feel comfortable with appointments every month and reaching out if her numbers are above goal.    She has an ultrasound scheduled later this week.  14-week ultrasound in June was reassuring. (Reviewed by myself.)      FH: mother, father, grandparents have  T2DM. Mom was diagnosed 5-6 years ago with type 2 diabetes at 51 yo.  Dad diagnosed at 60 some.     SH: , has 3 kids, no tobacco, Limited alcohol use when not pregnant, no alcohol since pregnant , no illicit drug use.      ROS:  All 12 systems were reviewed and negative except as mentioned in HPI.  Specifically she denies any difficulties with headache vision muscle aches or cramping chest pain or shortness of breath, vomiting or weakness.    Past Medical/Surgical History:  Past Medical History:   Diagnosis Date     History of asthma     \"seasonal\"     History of gestational diabetes     \"high pre-diabetic level vs. type 2 per endocrinologist\"     Past Surgical History:   Procedure Laterality Date     WISDOM TOOTH EXTRACTION  2010       Allergies:  No Known Allergies    PTA Meds:  Prior to Admission medications    Medication Sig Last Dose Taking? Auth Provider   albuterol (PROAIR HFA/PROVENTIL HFA/VENTOLIN HFA) 108 (90 Base) MCG/ACT inhaler Inhale 1-2 puffs into the lungs   Reported, Patient   Loratadine (CLARITIN PO)    Reported, Patient   Prenatal Vit-Fe Fumarate-FA (PRENATAL MULTIVITAMIN W/IRON) 27-0.8 MG tablet Take 1 tablet by mouth daily   Reported, Patient        Current heard:   Current Outpatient Medications   Medication     albuterol (PROAIR HFA/PROVENTIL HFA/VENTOLIN HFA) 108 (90 Base) MCG/ACT inhaler     insulin  UNIT/ML injection     insulin pen needle (BD ELBERT U/F) 32G X 4 MM miscellaneous     Loratadine (CLARITIN PO)     Prenatal Vit-Fe Fumarate-FA (PRENATAL MULTIVITAMIN W/IRON) 27-0.8 MG tablet     No current facility-administered medications " "for this visit.       Family History:  Family History   Problem Relation Age of Onset     Obesity Mother      Diabetes Mother         type 2     Obesity Father      Diabetes Father         type 2     Asthma Brother      No Known Problems Daughter      No Known Problems Son      No Known Problems Son        Social History:  Social History     Tobacco Use     Smoking status: Never Smoker     Smokeless tobacco: Never Used   Substance Use Topics     Alcohol use: Not Currently         Physical examination:    Wt Readings from Last 10 Encounters:   22 81.4 kg (179 lb 8 oz)   22 80.3 kg (177 lb)   08/15/22 80.4 kg (177 lb 4.8 oz)   22 79.9 kg (176 lb 1.6 oz)   22 78.9 kg (174 lb)   22 76.5 kg (168 lb 9.6 oz)     Estimated body mass index is 28.71 kg/m  as calculated from the following:    Height as of 22: 1.684 m (5' 6.3\").    Weight as of an earlier encounter on 22: 81.4 kg (179 lb 8 oz).    Exam limited due to video visit due to COVID precautions and patient convenience.  General appearance: seated comfortably in the chair during assessment. Not in any acute distress  Lungs: Speaking full sentences  Neurological: conscious and oriented. Speech: normal.   Psychiatric:  mood is \"good\" and affect warm and appropriate.  Thought form making content are fluent and coherent.  Logical questions.    Endocrine Labs:  ENDO DIABETES Latest Ref Rng & Units 2022   A1C 0.0 - 5.6 % 5.9 (H)   HCT 35.0 - 47.0 % 38.9   HGB 11.7 - 15.7 g/dL 12.7   RBC 3.80 - 5.20 10e6/uL 4.53   RDW 10.0 - 15.0 % 12.4   WBC 4.0 - 11.0 10e3/uL 6.7   MCH 26.5 - 33.0 pg 28.0   MCHC 31.5 - 36.5 g/dL 32.6   MCV 78 - 100 fL 86    - 450 10e3/uL 191     Recent Labs   Lab Test 22  1304   A1C 5.9*            Assessment and Plan:     Destinee Caba is a 31 year old female  , now 28 weeks gestation, with most postprandial blood sugars at goal on NPH insulin, though fasting blood sugars continue to be " above goal.    1.  Gestational Diabetes, 22 w 4d.  Postprandial blood sugars at goal, fasting blood sugars above goal.    Destinee is again encouraged in the excellent work that she is doing to manage her gestational diabetes.    She is however having a hard time staying up until 1030 to get her evening dose.  I think that it would be fine to give this at 10 PM, or even 930 if needed, but if she notices blood sugars getting low below goal when awaking and or above goal after dinner with this change, we will need to move some of the dose to her breakfast.  I would recommend 32 units at bedtime and 6 units with breakfast to begin.    Currently planning for induction of labor at 39 weeks.  With this I generally suggest to give 50% of her evening dose the night prior to reduction, and forego any morning dose.  She may need insulin drip versus continuous sliding scale while in labor to keep blood sugars at goal.    RTC in 4 weeks and follow-up with CDE and dietitian interim for adjustments    It is my privilege to be involved in the care of the above patient.     Jackie Macario PA-C, [facility]  [facility]  Diabetes, Endocrinology, and Metabolism  [phone] Appointments/Nurse  [phone] Fax  [phone] pager  [phone] nurse line    26 minutes spent on the date of the encounter doing chart review, history and exam, documentation, education and counseling, as well as communication and coordination of care, and further activities as noted above.  This time excludes time spent reviewing CGM.              Again, thank you for allowing me to participate in the care of your patient.      Sincerely,    Jackie Macario PA-C

## 2022-09-14 DIAGNOSIS — Z86.32 HISTORY OF INSULIN CONTROLLED GESTATIONAL DIABETES MELLITUS: ICD-10-CM

## 2022-09-16 RX ORDER — PEN NEEDLE, DIABETIC 32GX 5/32"
NEEDLE, DISPOSABLE MISCELLANEOUS
OUTPATIENT
Start: 2022-09-16

## 2022-09-16 NOTE — TELEPHONE ENCOUNTER
Pen needle    9/13/2022  [facility] Endocrinology Clinic [place]       Jackie Macario PA-C    Endocrinology, Diabetes, and Metabolism

## 2022-09-20 ENCOUNTER — HOSPITAL ENCOUNTER (OUTPATIENT)
Dept: ULTRASOUND IMAGING | Facility: CLINIC | Age: 32
Discharge: HOME | End: 2022-09-20
Attending: OBSTETRICS & GYNECOLOGY | Admitting: OBSTETRICS & GYNECOLOGY
Payer: COMMERCIAL

## 2022-09-20 DIAGNOSIS — Z86.32 HISTORY OF INSULIN CONTROLLED GESTATIONAL DIABETES MELLITUS: ICD-10-CM

## 2022-09-20 PROCEDURE — 76816 OB US FOLLOW-UP PER FETUS: CPT

## 2022-09-24 ENCOUNTER — HEALTH MAINTENANCE LETTER (OUTPATIENT)
Age: 32
End: 2022-09-24

## 2022-09-26 ENCOUNTER — PRENATAL OFFICE VISIT (OUTPATIENT)
Dept: OBGYN | Facility: CLINIC | Age: 32
End: 2022-09-26
Payer: COMMERCIAL

## 2022-09-26 VITALS
WEIGHT: 182.9 LBS | SYSTOLIC BLOOD PRESSURE: 122 MMHG | HEART RATE: 115 BPM | BODY MASS INDEX: 29.26 KG/M2 | OXYGEN SATURATION: 96 % | DIASTOLIC BLOOD PRESSURE: 70 MMHG

## 2022-09-26 DIAGNOSIS — Z86.32 HISTORY OF INSULIN CONTROLLED GESTATIONAL DIABETES MELLITUS: Primary | ICD-10-CM

## 2022-09-26 PROCEDURE — 59025 FETAL NON-STRESS TEST: CPT | Performed by: OBSTETRICS & GYNECOLOGY

## 2022-09-26 PROCEDURE — 99207 PR PRENATAL VISIT: CPT | Performed by: OBSTETRICS & GYNECOLOGY

## 2022-09-26 NOTE — PROGRESS NOTES
NST category one.  ROS neg for concerns  Each baby weighed between 7 and ~9 pounds  US reviewed.  Mom with large gest size.  No management change  Growth US in 3 weeks

## 2022-10-06 NOTE — PROGRESS NOTES
Destinee Caba  is being evaluated via a billable video visit.      How would you like to obtain your AVS? HeyKiki  For the video visit, send the invitation by: Text to cell phone: [phone]  Will anyone else be joining your video visit? No    Outcome for 10/10/22 8:45 AM: Glucose Readings sent via Wedit  TOBY De La Cruz          Diabetes Clinic  Consult Video viist  Time visit started: 13: 41  Time visit  Ended: 13: 59  Location of patient: home  Location of provider: [place]  Platform: Nati Caba   MRN:[id number]   YOB: 1990    Reason for Endocrine consult: Hx of insulin controlled GDM and pregnancy    HPI:  Destinee Caba is a 31 year old female  , currently 31 w 4 d pregnant with MAURICIO: 22. HbA1c: 5.9 in 22.  Weight 76.5 kg    Per chart review:  Destinee has been pregnant 3 times and each gestation was noted for history of gestational DM.  She has had both diet controlled as well as insulin medicated management.     First pregnancy was a miscarriage  Second pregnancy she had a boy, had shoulder dystocia. During that pregnancy she used glyburide at night time and that was enough to keep her BG under target    Her third pregnancy at term she has used NPH 55 units at bedtime and novolog or humalog with meals upto 14 units with each meal. She has previously seen diabetes educator at OSH for carb counting and was advised to do 2-3 carb choices per meal.    For her 4th pregnancy , she had her OBGYN care at [place],  At 12 weeks pregnant she had her HbA1c was checked and it was at 9. She used NPH 12 units at bedtime throughout the preganncy and after diet modification her HbA1c was 5.1.    Every post partum 6 week check her BG were well controlled and she was never on any oral or injections for her blood glucoses.     Interim:  Reassuring visit all normal when visited with OB yesterday.    Reassuring ultrasound onset of 20thHISTORY:  Evaluate for growth around 32 weeks. History of insulin  controlled gestational diabetes mellitus.     COMPARISON: OB ultrasound dated 8/11/2022.     FINDINGS:      Presentation: Cephalic.  Cardiac activity: 142 BPM. Regular rhythm.  Movement: Unremarkable.  Placenta: Anterior and grade 1.   Adnexa: Not visualized.   Cervical length: Not visualized.  Amniotic fluid: Unremarkable. MVP: 5.6 cm.     Other findings: None.  A complete anatomy scan was not performed.      Measured parameters:       BPD:  8.4 cm      Age: 33 weeks 6 days, 77th percentile.       HC:    31.7 cm      Age: 35 weeks 5 days, 87th percentile.       AC:  30.4 cm      Age: 34 weeks 3 days, 92nd percentile.       FL:   6.4 cm      Age: 33 weeks 0 days, 47th percentile.     Gestational age by current ultrasound measurement: 34 weeks 2 days,  corresponding to an MAURICIO of 10/30/2022.     Gestational age based on the reported previously established due date:  32 weeks 4 days, corresponding to an MAURICIO of 11/11/2022.     Estimated fetal weight: 2337 grams, corresponding to the 73rd  percentile based on the reported previously established due date.                                                                       IMPRESSION:    1. Single live intrauterine pregnancy of 34 weeks 2 days gestation by  current ultrasound measurement. Fetal growth is 12 days more advanced  than what is expected from the reported previously established due  date.  2. Otherwise unremarkable limited obstetric ultrasound.        Today:     She has been pleasantly surprised that when moved BG to earlier time blood sugar fine.  Has had rare low BG.  Had 1 last week or prior as went on a walk with her kids and didn't realize it was just before a meal, realized at end of road felt nausea, weak, sent son to go ahead and grab a a [place] bar and then able to get back have lunch all fine. Recalls last pregnancy active labor 10 hours and then she had to eat spouses snacks as BG very low  "quickly after delivery.    Not needing morning insulin, just likes having a regular schedule for her eating.  Is getting balanced, nutritious diet, still doing kettle bells twice weekly, walking on treadmill with incline   - at end of day back hurts more so if walks more in the day.  No block    BG data:  fasting, 1 hour post breakfast, 1 hour post lunch, 1 hour post dinner.  I did have one or two spots where either I could not check or there was a situation I can explain over the appointment with a missing fasting number.      9/23: 90, 119, 120, 108  9/24: 88, 105, 134, 116  9/25: 89, 113, 132, 129  9/26: x, 108, 133, x  9/27: 87, 124, 129, x  9/28: 90, 107, 128, 117  9/29: 91, 113, 134, 103  9/30: 82, 111, 133, 136  10/1: 90, 119, 130, 124  10/2: 89, 126, 135, 123  10/3: 88, 109, 127, 131  10/4: 91, 115, 129, 127  10/5: 92, 117, 130, 124  10/6: 90, 120, 138, 131  10/7: 88, 110, 127, 125  10/8: 89, 119, 126, 132        Current DM medications:  NPH 38 units qhs      ROS:  All 12 systems were reviewed and negative except as mentioned in HPI.  Specifically she denies any difficulties with headache vision muscle aches or cramping chest pain or shortness of breath, vomiting or weakness.    Past Medical/Surgical History:  Past Medical History:   Diagnosis Date     History of asthma     \"seasonal\"     History of gestational diabetes     \"high pre-diabetic level vs. type 2 per endocrinologist\"     Past Surgical History:   Procedure Laterality Date     WISDOM TOOTH EXTRACTION  2010       Allergies:  No Known Allergies    PTA Meds:  Prior to Admission medications    Medication Sig Last Dose Taking? Auth Provider   albuterol (PROAIR HFA/PROVENTIL HFA/VENTOLIN HFA) 108 (90 Base) MCG/ACT inhaler Inhale 1-2 puffs into the lungs   Reported, Patient   Loratadine (CLARITIN PO)    Reported, Patient   Prenatal Vit-Fe Fumarate-FA (PRENATAL MULTIVITAMIN W/IRON) 27-0.8 MG tablet Take 1 tablet by mouth daily   Reported, Patient    " "    Current heard:   Current Outpatient Medications   Medication     albuterol (PROAIR HFA/PROVENTIL HFA/VENTOLIN HFA) 108 (90 Base) MCG/ACT inhaler     insulin  UNIT/ML injection     insulin pen needle (BD ELBERT U/F) 32G X 4 MM miscellaneous     Loratadine (CLARITIN PO)     Prenatal Vit-Fe Fumarate-FA (PRENATAL MULTIVITAMIN W/IRON) 27-0.8 MG tablet     No current facility-administered medications for this visit.       Family History:  Family History   Problem Relation Age of Onset     Obesity Mother      Diabetes Mother         type 2     Obesity Father      Diabetes Father         type 2     Asthma Brother      No Known Problems Daughter      No Known Problems Son      No Known Problems Son        Social History:  Social History     Tobacco Use     Smoking status: Never     Smokeless tobacco: Never   Substance Use Topics     Alcohol use: Not Currently         Physical examination:    Wt Readings from Last 10 Encounters:   10/10/22 83.5 kg (184 lb 1 oz)   09/26/22 83 kg (182 lb 14.4 oz)   09/13/22 81.4 kg (179 lb 8 oz)   08/29/22 80.3 kg (177 lb)   08/15/22 80.4 kg (177 lb 4.8 oz)   07/26/22 79.9 kg (176 lb 1.6 oz)   06/29/22 78.9 kg (174 lb)   05/23/22 76.5 kg (168 lb 9.6 oz)     Estimated body mass index is 29.44 kg/m  as calculated from the following:    Height as of 5/23/22: 1.684 m (5' 6.3\").    Weight as of 10/10/22: 83.5 kg (184 lb 1 oz).    Exam limited due to video visit due to COVID precautions and patient convenience.  General appearance: seated comfortably in the chair during assessment. Not in any acute distress  Lungs: Speaking full sentences  Neurological: conscious and oriented. Speech: normal.   Psychiatric:  mood is \"good\" and affect warm and appropriate.  Thought form making content are fluent and coherent.  Logical questions.    Endocrine Labs:  ENDO DIABETES Latest Ref Rng & Units 5/23/2022   A1C 0.0 - 5.6 % 5.9 (H)   HCT 35.0 - 47.0 % 38.9   HGB 11.7 - 15.7 g/dL 12.7   RBC 3.80 - 5.20 " 10e6/uL 4.53   RDW 10.0 - 15.0 % 12.4   WBC 4.0 - 11.0 10e3/uL 6.7   MCH 26.5 - 33.0 pg 28.0   MCHC 31.5 - 36.5 g/dL 32.6   MCV 78 - 100 fL 86    - 450 10e3/uL 191     Recent Labs   Lab Test 22  1304   A1C 5.9*            Assessment and Plan:     Destinee Caba is a 31 year old female  , now 35w4d weeks gestation, with most postprandial blood sugars at goal on NPH insulin, though fasting blood sugars continue to be above goal.    1.  Gestational Diabetes, 35 w 4d.  Both fasting and postprandial blood sugars are at goal with ongoing excellent lifestyle management and nightly NPH insulin once daily.    Encouraged in her ongoing great management.  Discussed the need to keep a rapidly absorbed carbohydrate with her at all times in case of low as the risk for this does increase in late pregnancy.  Advised that she does have a significant low to reduce her insulin just 34 units in the evening and let her OB know right away.  Discussed that if she goes into active labor she might decrease her NPH dosage and advised the hospital upon arrival of this.  I am suggesting that she  some glucose gel to bring to her in case an IV is not established prior to delivery though I would really do want that to be done in case of postpartum hypoglycemia.    Reviewed the recommendation for an oral glucose tolerance test 6 weeks after delivery and an A1c annually on their after as well as being vigilant for any symptoms of high blood sugar.    It is my privilege to be involved in the care of the above patient.     Jackie Macario PA-C, MPAS  [facility]  Diabetes, Endocrinology, and Metabolism  [phone] Appointments/Nurse  [phone] Fax  [phone] pager  [phone] nurse line    31 minutes spent on the date of the encounter doing chart review, history and exam, documentation, education and counseling, as well as communication and coordination of care, and further activities as noted  above.  This time excludes time spent reviewing CGM.    13:09 - 13:27

## 2022-10-10 ENCOUNTER — PRENATAL OFFICE VISIT (OUTPATIENT)
Dept: OBGYN | Facility: CLINIC | Age: 32
End: 2022-10-10
Payer: COMMERCIAL

## 2022-10-10 VITALS
WEIGHT: 184.06 LBS | BODY MASS INDEX: 29.44 KG/M2 | DIASTOLIC BLOOD PRESSURE: 69 MMHG | SYSTOLIC BLOOD PRESSURE: 110 MMHG | HEART RATE: 110 BPM

## 2022-10-10 DIAGNOSIS — Z86.32 HISTORY OF INSULIN CONTROLLED GESTATIONAL DIABETES MELLITUS: ICD-10-CM

## 2022-10-10 DIAGNOSIS — O24.419 GESTATIONAL DIABETES MELLITUS (GDM) AFFECTING FOURTH PREGNANCY: ICD-10-CM

## 2022-10-10 DIAGNOSIS — O24.414 INSULIN CONTROLLED GESTATIONAL DIABETES MELLITUS (GDM) IN THIRD TRIMESTER: Primary | ICD-10-CM

## 2022-10-10 PROCEDURE — 99207 PR PRENATAL VISIT: CPT | Performed by: OBSTETRICS & GYNECOLOGY

## 2022-10-10 NOTE — PROGRESS NOTES
Rare contractions.  Monitoring and maintaining good glucose control  NST Category one  Growth US/BPP/NST next week  ROS neg for pregnancy issues  RTC one week

## 2022-10-11 ENCOUNTER — VIRTUAL VISIT (OUTPATIENT)
Dept: ENDOCRINOLOGY | Facility: CLINIC | Age: 32
End: 2022-10-11
Payer: COMMERCIAL

## 2022-10-11 DIAGNOSIS — O24.414 INSULIN CONTROLLED GESTATIONAL DIABETES MELLITUS (GDM) IN THIRD TRIMESTER: Primary | ICD-10-CM

## 2022-10-11 PROCEDURE — 99214 OFFICE O/P EST MOD 30 MIN: CPT | Mod: 95 | Performed by: PHYSICIAN ASSISTANT

## 2022-10-11 NOTE — LETTER
10/11/2022       RE: Destinee Caba  [address] [facility] [address]     Dear Colleague,    Thank you for referring your patient, Destinee Caba, to the [facility] [place] at [facility]. Please see a copy of my visit note below.    Destinee Caba  is being evaluated via a billable video visit.      How would you like to obtain your AVS? [place]  For the video visit, send the invitation by: Text to cell phone: [phone]  Will anyone else be joining your video visit? No    Outcome for 10/10/22 8:45 AM: Glucose Readings sent via [employer]App  TOBY De La Cruz          Diabetes Clinic  Consult Video viist  Time visit started: 13: 41  Time visit  Ended: 13: 59  Location of patient: home  Location of provider: [employer]  Platform: [employer]       Destinee Caba   MRN:[id number]   YOB: 1990    Reason for Endocrine consult: Hx of insulin controlled GDM and pregnancy    HPI:  Destinee Caba is a 31 year old female  , currently 31 w 4 d pregnant with MAURICIO: 22. HbA1c: 5.9 in 22.  Weight 76.5 kg    Per chart review:  Destinee has been pregnant 3 times and each gestation was noted for history of gestational DM.  She has had both diet controlled as well as insulin medicated management.     First pregnancy was a miscarriage  Second pregnancy she had a boy, had shoulder dystocia. During that pregnancy she used glyburide at night time and that was enough to keep her BG under target    Her third pregnancy at term she has used NPH 55 units at bedtime and novolog or humalog with meals upto 14 units with each meal. She has previously seen diabetes educator at OSH for carb counting and was advised to do 2-3 carb choices per meal.    For her 4th pregnancy , she had her OBGYN care at [place],  At 12 weeks pregnant she had her HbA1c was checked and it was at 9. She used NPH 12 units at bedtime  throughout the preganncy and after diet modification her HbA1c was 5.1.    Every post partum 6 week check her BG were well controlled and she was never on any oral or injections for her blood glucoses.     Interim:  Reassuring visit all normal when visited with OB yesterday.    Reassuring ultrasound onset of 20thHISTORY: Evaluate for growth around 32 weeks. History of insulin  controlled gestational diabetes mellitus.     COMPARISON: OB ultrasound dated 8/11/2022.     FINDINGS:      Presentation: Cephalic.  Cardiac activity: 142 BPM. Regular rhythm.  Movement: Unremarkable.  Placenta: Anterior and grade 1.   Adnexa: Not visualized.   Cervical length: Not visualized.  Amniotic fluid: Unremarkable. MVP: 5.6 cm.     Other findings: None.  A complete anatomy scan was not performed.      Measured parameters:       BPD:  8.4 cm      Age: 33 weeks 6 days, 77th percentile.       HC:    31.7 cm      Age: 35 weeks 5 days, 87th percentile.       AC:  30.4 cm      Age: 34 weeks 3 days, 92nd percentile.       FL:   6.4 cm      Age: 33 weeks 0 days, 47th percentile.     Gestational age by current ultrasound measurement: 34 weeks 2 days,  corresponding to an MAURICIO of 10/30/2022.     Gestational age based on the reported previously established due date:  32 weeks 4 days, corresponding to an MAURICIO of 11/11/2022.     Estimated fetal weight: 2337 grams, corresponding to the 73rd  percentile based on the reported previously established due date.                                                                       IMPRESSION:    1. Single live intrauterine pregnancy of 34 weeks 2 days gestation by  current ultrasound measurement. Fetal growth is 12 days more advanced  than what is expected from the reported previously established due  date.  2. Otherwise unremarkable limited obstetric ultrasound.        Today:     She has been pleasantly surprised that when moved BG to earlier time blood sugar fine.  Has had rare low BG.  Had 1 last week  "or prior as went on a walk with her kids and didn't realize it was just before a meal, realized at end of road felt nausea, weak, sent son to go ahead and grab a a [place] bar and then able to get back have lunch all fine. Recalls last pregnancy active labor 10 hours and then she had to eat spouses snacks as BG very low quickly after delivery.    Not needing morning insulin, just likes having a regular schedule for her eating.  Is getting balanced, nutritious diet, still doing kettle bells twice weekly, walking on treadmill with incline   - at end of day back hurts more so if walks more in the day.  No block    BG data:  fasting, 1 hour post breakfast, 1 hour post lunch, 1 hour post dinner.  I did have one or two spots where either I could not check or there was a situation I can explain over the appointment with a missing fasting number.      9/23: 90, 119, 120, 108  9/24: 88, 105, 134, 116  9/25: 89, 113, 132, 129  9/26: x, 108, 133, x  9/27: 87, 124, 129, x  9/28: 90, 107, 128, 117  9/29: 91, 113, 134, 103  9/30: 82, 111, 133, 136  10/1: 90, 119, 130, 124  10/2: 89, 126, 135, 123  10/3: 88, 109, 127, 131  10/4: 91, 115, 129, 127  10/5: 92, 117, 130, 124  10/6: 90, 120, 138, 131  10/7: 88, 110, 127, 125  10/8: 89, 119, 126, 132        Current DM medications:  NPH 38 units qhs      ROS:  All 12 systems were reviewed and negative except as mentioned in HPI.  Specifically she denies any difficulties with headache vision muscle aches or cramping chest pain or shortness of breath, vomiting or weakness.    Past Medical/Surgical History:  Past Medical History:   Diagnosis Date     History of asthma     \"seasonal\"     History of gestational diabetes     \"high pre-diabetic level vs. type 2 per endocrinologist\"     Past Surgical History:   Procedure Laterality Date     WISDOM TOOTH EXTRACTION  2010       Allergies:  No Known Allergies    PTA Meds:  Prior to Admission medications    Medication Sig Last Dose Taking? Auth " "Provider   albuterol (PROAIR HFA/PROVENTIL HFA/VENTOLIN HFA) 108 (90 Base) MCG/ACT inhaler Inhale 1-2 puffs into the lungs   Reported, Patient   Loratadine (CLARITIN PO)    Reported, Patient   Prenatal Vit-Fe Fumarate-FA (PRENATAL MULTIVITAMIN W/IRON) 27-0.8 MG tablet Take 1 tablet by mouth daily   Reported, Patient        Current heard:   Current Outpatient Medications   Medication     albuterol (PROAIR HFA/PROVENTIL HFA/VENTOLIN HFA) 108 (90 Base) MCG/ACT inhaler     insulin  UNIT/ML injection     insulin pen needle (BD ELBERT U/F) 32G X 4 MM miscellaneous     Loratadine (CLARITIN PO)     Prenatal Vit-Fe Fumarate-FA (PRENATAL MULTIVITAMIN W/IRON) 27-0.8 MG tablet     No current facility-administered medications for this visit.       Family History:  Family History   Problem Relation Age of Onset     Obesity Mother      Diabetes Mother         type 2     Obesity Father      Diabetes Father         type 2     Asthma Brother      No Known Problems Daughter      No Known Problems Son      No Known Problems Son        Social History:  Social History     Tobacco Use     Smoking status: Never     Smokeless tobacco: Never   Substance Use Topics     Alcohol use: Not Currently         Physical examination:    Wt Readings from Last 10 Encounters:   10/10/22 83.5 kg (184 lb 1 oz)   09/26/22 83 kg (182 lb 14.4 oz)   09/13/22 81.4 kg (179 lb 8 oz)   08/29/22 80.3 kg (177 lb)   08/15/22 80.4 kg (177 lb 4.8 oz)   07/26/22 79.9 kg (176 lb 1.6 oz)   06/29/22 78.9 kg (174 lb)   05/23/22 76.5 kg (168 lb 9.6 oz)     Estimated body mass index is 29.44 kg/m  as calculated from the following:    Height as of 5/23/22: 1.684 m (5' 6.3\").    Weight as of 10/10/22: 83.5 kg (184 lb 1 oz).    Exam limited due to video visit due to COVID precautions and patient convenience.  General appearance: seated comfortably in the chair during assessment. Not in any acute distress  Lungs: Speaking full sentences  Neurological: conscious and oriented. " "Speech: normal.   Psychiatric:  mood is \"good\" and affect warm and appropriate.  Thought form making content are fluent and coherent.  Logical questions.    Endocrine Labs:  ENDO DIABETES Latest Ref Rng & Units 2022   A1C 0.0 - 5.6 % 5.9 (H)   HCT 35.0 - 47.0 % 38.9   HGB 11.7 - 15.7 g/dL 12.7   RBC 3.80 - 5.20 10e6/uL 4.53   RDW 10.0 - 15.0 % 12.4   WBC 4.0 - 11.0 10e3/uL 6.7   MCH 26.5 - 33.0 pg 28.0   MCHC 31.5 - 36.5 g/dL 32.6   MCV 78 - 100 fL 86    - 450 10e3/uL 191     Recent Labs   Lab Test 22  1304   A1C 5.9*            Assessment and Plan:     Destinee Caba is a 31 year old female  , now 35w4d weeks gestation, with most postprandial blood sugars at goal on NPH insulin, though fasting blood sugars continue to be above goal.    1.  Gestational Diabetes, 35 w 4d.  Both fasting and postprandial blood sugars are at goal with ongoing excellent lifestyle management and nightly NPH insulin once daily.    Encouraged in her ongoing great management.  Discussed the need to keep a rapidly absorbed carbohydrate with her at all times in case of low as the risk for this does increase in late pregnancy.  Advised that she does have a significant low to reduce her insulin just 34 units in the evening and let her OB know right away.  Discussed that if she goes into active labor she might decrease her NPH dosage and advised the hospital upon arrival of this.  I am suggesting that she  some glucose gel to bring to her in case an IV is not established prior to delivery though I would really do want that to be done in case of postpartum hypoglycemia.    Reviewed the recommendation for an oral glucose tolerance test 6 weeks after delivery and an A1c annually on their after as well as being vigilant for any symptoms of high blood sugar.    It is my privilege to be involved in the care of the above patient.     Jackie Macario PA-C, MPAS  [facility]  Diabetes, Endocrinology, and " Metabolism  [phone] Appointments/Nurse  [phone] Fax  [phone] pager  [phone] nurse line    31 minutes spent on the date of the encounter doing chart review, history and exam, documentation, education and counseling, as well as communication and coordination of care, and further activities as noted above.  This time excludes time spent reviewing CGM.    13:09 - 13:27          Again, thank you for allowing me to participate in the care of your patient.      Sincerely,    Jackie Macario PA-C

## 2022-10-11 NOTE — LETTER
Date:October 18, 2022      Provider requested that no letter be sent. Do not send.       [facility]

## 2022-10-17 ENCOUNTER — PRENATAL OFFICE VISIT (OUTPATIENT)
Dept: OBGYN | Facility: OTHER | Age: 32
End: 2022-10-17
Payer: COMMERCIAL

## 2022-10-17 VITALS — DIASTOLIC BLOOD PRESSURE: 75 MMHG | WEIGHT: 189 LBS | BODY MASS INDEX: 30.23 KG/M2 | SYSTOLIC BLOOD PRESSURE: 119 MMHG

## 2022-10-17 DIAGNOSIS — J45.20 MILD INTERMITTENT ASTHMA WITHOUT COMPLICATION: ICD-10-CM

## 2022-10-17 DIAGNOSIS — Z87.59 HISTORY OF FOURTH DEGREE PERINEAL LACERATION: ICD-10-CM

## 2022-10-17 DIAGNOSIS — O09.299 HISTORY OF GESTATIONAL DIABETES IN PRIOR PREGNANCY, CURRENTLY PREGNANT: ICD-10-CM

## 2022-10-17 DIAGNOSIS — Z86.32 HISTORY OF GESTATIONAL DIABETES IN PRIOR PREGNANCY, CURRENTLY PREGNANT: ICD-10-CM

## 2022-10-17 DIAGNOSIS — O24.414 INSULIN CONTROLLED GESTATIONAL DIABETES MELLITUS (GDM) IN THIRD TRIMESTER: ICD-10-CM

## 2022-10-17 DIAGNOSIS — Z34.93 NORMAL PREGNANCY IN THIRD TRIMESTER: Primary | ICD-10-CM

## 2022-10-17 DIAGNOSIS — Z86.32 HISTORY OF INSULIN CONTROLLED GESTATIONAL DIABETES MELLITUS: ICD-10-CM

## 2022-10-17 PROBLEM — J45.909 MILD ASTHMA: Status: ACTIVE | Noted: 2022-10-17

## 2022-10-17 PROCEDURE — 87653 STREP B DNA AMP PROBE: CPT | Performed by: OBSTETRICS & GYNECOLOGY

## 2022-10-17 PROCEDURE — 99207 PR PRENATAL VISIT: CPT | Performed by: OBSTETRICS & GYNECOLOGY

## 2022-10-17 NOTE — PATIENT INSTRUCTIONS
What to watch out for are: regular contractions every 5 min, vaginal bleeding, decreased fetal movement, or leakage of fluid.  Please call the office or go to L&D if you develop any of these signs and symptoms.      I will see you for your follow up appointment.  Please feel free to call if you have any questions or concerns.      Thanks,  Ashely Byrd, DO

## 2022-10-17 NOTE — PROGRESS NOTES
31 year old  at 36w3d weeks presents to the clinic for a routine prenatal visit.  No concerns.  No vaginal bleeding, leakage of fluid, or contractions  Fundal height=37cm  RTDd=176  CX=deferred per patient request  GBS done today  GDMA2=38U at bedtime.  Stable.  Patient is following up with endocrinology.  Per patient endocrinology has been happy with patient's blood sugar numbers.  Weekly BPPs.  We discussed the need for an induction however patient states she does not think that will be necessary given that she does not go past 38 and half weeks.  Patient states she has a history of a 4th degree.  She has had 2 subsequent vaginal deliveries with only second degrees.  We discussed possible .  Patient needs a growth ultrasound.  Will help her to schedule that if not already scheduled with [facility]  History of shoulder dystocia with first pregnancy.  Subsequent pregnancy babies were smaller  Asthma=stable  Labor precautions discussed  RTC 1 week    Ashely Byrd DO

## 2022-10-18 LAB — GP B STREP DNA SPEC QL NAA+PROBE: NEGATIVE

## 2022-10-21 ENCOUNTER — HOSPITAL ENCOUNTER (OUTPATIENT)
Dept: ULTRASOUND IMAGING | Facility: CLINIC | Age: 32
Discharge: HOME | End: 2022-10-21
Attending: OBSTETRICS & GYNECOLOGY | Admitting: OBSTETRICS & GYNECOLOGY
Payer: COMMERCIAL

## 2022-10-21 DIAGNOSIS — Z86.32 HISTORY OF INSULIN CONTROLLED GESTATIONAL DIABETES MELLITUS: ICD-10-CM

## 2022-10-21 PROCEDURE — 76816 OB US FOLLOW-UP PER FETUS: CPT

## 2022-10-21 PROCEDURE — 76819 FETAL BIOPHYS PROFIL W/O NST: CPT

## 2022-10-25 ENCOUNTER — PRENATAL OFFICE VISIT (OUTPATIENT)
Dept: OBGYN | Facility: OTHER | Age: 32
End: 2022-10-25
Payer: COMMERCIAL

## 2022-10-25 ENCOUNTER — ANCILLARY PROCEDURE (OUTPATIENT)
Dept: ULTRASOUND IMAGING | Facility: OTHER | Age: 32
End: 2022-10-25
Attending: OBSTETRICS & GYNECOLOGY
Payer: COMMERCIAL

## 2022-10-25 VITALS
HEART RATE: 68 BPM | DIASTOLIC BLOOD PRESSURE: 75 MMHG | WEIGHT: 189.1 LBS | SYSTOLIC BLOOD PRESSURE: 102 MMHG | BODY MASS INDEX: 30.25 KG/M2

## 2022-10-25 DIAGNOSIS — O36.63X0 LARGE FOR GESTATIONAL AGE FETUS AFFECTING MOTHER, ANTEPARTUM, THIRD TRIMESTER, SINGLE GESTATION: Primary | ICD-10-CM

## 2022-10-25 DIAGNOSIS — Z34.93 NORMAL PREGNANCY IN THIRD TRIMESTER: ICD-10-CM

## 2022-10-25 DIAGNOSIS — Z86.32 HISTORY OF INSULIN CONTROLLED GESTATIONAL DIABETES MELLITUS: ICD-10-CM

## 2022-10-25 PROCEDURE — 99207 PR PRENATAL VISIT: CPT | Performed by: OBSTETRICS & GYNECOLOGY

## 2022-10-25 PROCEDURE — 76819 FETAL BIOPHYS PROFIL W/O NST: CPT | Mod: TC | Performed by: RADIOLOGY

## 2022-10-25 NOTE — PROGRESS NOTES
Nl BPP  Size greater than dates  Discussed inducing at 39 weeks, Membrane sweep next visit  Baby active  No issue  RTC one week  BPP today

## 2022-10-26 NOTE — PROGRESS NOTES
Reviewed last weeks growth US and BPP.  Discussed size of baby and effects of diabetes on gestational development and in this case a baby that is large for gest age.  Suggest induction at 39 weeks and consider stripping the membranes earlier to move in the direction of delivery prior to 39 weeks.  Fetal maturity with diabetes considered and earlier induction not considered to be an appropriate option

## 2022-11-01 ENCOUNTER — PRENATAL OFFICE VISIT (OUTPATIENT)
Dept: OBGYN | Facility: OTHER | Age: 32
End: 2022-11-01
Payer: COMMERCIAL

## 2022-11-01 ENCOUNTER — ANCILLARY PROCEDURE (OUTPATIENT)
Dept: ULTRASOUND IMAGING | Facility: OTHER | Age: 32
End: 2022-11-01
Attending: OBSTETRICS & GYNECOLOGY
Payer: COMMERCIAL

## 2022-11-01 VITALS
WEIGHT: 191.1 LBS | DIASTOLIC BLOOD PRESSURE: 76 MMHG | SYSTOLIC BLOOD PRESSURE: 117 MMHG | BODY MASS INDEX: 30.57 KG/M2 | HEART RATE: 82 BPM

## 2022-11-01 DIAGNOSIS — Z86.32 HISTORY OF INSULIN CONTROLLED GESTATIONAL DIABETES MELLITUS: ICD-10-CM

## 2022-11-01 DIAGNOSIS — Z34.93 NORMAL PREGNANCY IN THIRD TRIMESTER: Primary | ICD-10-CM

## 2022-11-01 PROCEDURE — 76819 FETAL BIOPHYS PROFIL W/O NST: CPT | Mod: TC | Performed by: RADIOLOGY

## 2022-11-01 PROCEDURE — 99207 PR PRENATAL VISIT: CPT | Performed by: OBSTETRICS & GYNECOLOGY

## 2022-11-04 ENCOUNTER — MYC MEDICAL ADVICE (OUTPATIENT)
Dept: OBGYN | Facility: OTHER | Age: 32
End: 2022-11-04

## 2022-11-04 NOTE — TELEPHONE ENCOUNTER
39w0d,     Pt questioning if induction can/should be scheduled. Scheduled her for prenatal with Dr. Hester .

## 2022-11-05 RX ORDER — SODIUM CHLORIDE, SODIUM LACTATE, POTASSIUM CHLORIDE, CALCIUM CHLORIDE 600; 310; 30; 20 MG/100ML; MG/100ML; MG/100ML; MG/100ML
INJECTION, SOLUTION INTRAVENOUS CONTINUOUS PRN
Status: DISCONTINUED | OUTPATIENT
Start: 2022-11-05 | End: 2022-11-10

## 2022-11-05 RX ORDER — HYDROXYZINE HYDROCHLORIDE 50 MG/1
50 TABLET, FILM COATED ORAL
Status: ACTIVE | OUTPATIENT
Start: 2022-11-05

## 2022-11-05 RX ORDER — TERBUTALINE SULFATE 1 MG/ML
0.25 INJECTION, SOLUTION SUBCUTANEOUS
Status: DISCONTINUED | OUTPATIENT
Start: 2022-11-05 | End: 2022-11-10

## 2022-11-05 RX ORDER — OXYTOCIN/0.9 % SODIUM CHLORIDE 30/500 ML
1-24 PLASTIC BAG, INJECTION (ML) INTRAVENOUS CONTINUOUS
Status: DISCONTINUED | OUTPATIENT
Start: 2022-11-05 | End: 2022-11-10

## 2022-11-05 RX ORDER — MISOPROSTOL 100 UG/1
25 TABLET ORAL EVERY 4 HOURS PRN
Status: DISCONTINUED | OUTPATIENT
Start: 2022-11-05 | End: 2022-11-10

## 2022-11-05 RX ORDER — LIDOCAINE 40 MG/G
CREAM TOPICAL
Status: DISCONTINUED | OUTPATIENT
Start: 2022-11-05 | End: 2022-11-10

## 2022-11-08 ENCOUNTER — HOSPITAL ENCOUNTER (INPATIENT)
Facility: CLINIC | Age: 32
LOS: 2 days | Discharge: HOME | End: 2022-11-10
Attending: OBSTETRICS & GYNECOLOGY | Admitting: FAMILY MEDICINE
Payer: COMMERCIAL

## 2022-11-08 PROBLEM — Z34.90 ENCOUNTER FOR INDUCTION OF LABOR: Status: ACTIVE | Noted: 2022-11-08

## 2022-11-08 LAB
ABO/RH(D): NORMAL
ANTIBODY SCREEN: NEGATIVE
BASOPHILS # BLD AUTO: 0.1 10E3/UL (ref 0–0.2)
BASOPHILS NFR BLD AUTO: 1 %
EOSINOPHIL # BLD AUTO: 0.1 10E3/UL (ref 0–0.7)
EOSINOPHIL NFR BLD AUTO: 1 %
ERYTHROCYTE [DISTWIDTH] IN BLOOD BY AUTOMATED COUNT: 14.1 % (ref 10–15)
GLUCOSE BLDC GLUCOMTR-MCNC: 112 MG/DL (ref 70–99)
GLUCOSE BLDC GLUCOMTR-MCNC: 92 MG/DL (ref 70–99)
HCT VFR BLD AUTO: 34 % (ref 35–47)
HGB BLD-MCNC: 10.8 G/DL (ref 11.7–15.7)
IMM GRANULOCYTES # BLD: 0 10E3/UL
IMM GRANULOCYTES NFR BLD: 1 %
LYMPHOCYTES # BLD AUTO: 2.2 10E3/UL (ref 0.8–5.3)
LYMPHOCYTES NFR BLD AUTO: 26 %
MCH RBC QN AUTO: 25.4 PG (ref 26.5–33)
MCHC RBC AUTO-ENTMCNC: 31.8 G/DL (ref 31.5–36.5)
MCV RBC AUTO: 80 FL (ref 78–100)
MONOCYTES # BLD AUTO: 0.6 10E3/UL (ref 0–1.3)
MONOCYTES NFR BLD AUTO: 7 %
NEUTROPHILS # BLD AUTO: 5.5 10E3/UL (ref 1.6–8.3)
NEUTROPHILS NFR BLD AUTO: 64 %
NRBC # BLD AUTO: 0 10E3/UL
NRBC BLD AUTO-RTO: 0 /100
PLATELET # BLD AUTO: 192 10E3/UL (ref 150–450)
RBC # BLD AUTO: 4.25 10E6/UL (ref 3.8–5.2)
SARS-COV-2 RNA RESP QL NAA+PROBE: NEGATIVE
SPECIMEN EXPIRATION DATE: NORMAL
WBC # BLD AUTO: 8.4 10E3/UL (ref 4–11)

## 2022-11-08 PROCEDURE — U0003 INFECTIOUS AGENT DETECTION BY NUCLEIC ACID (DNA OR RNA); SEVERE ACUTE RESPIRATORY SYNDROME CORONAVIRUS 2 (SARS-COV-2) (CORONAVIRUS DISEASE [COVID-19]), AMPLIFIED PROBE TECHNIQUE, MAKING USE OF HIGH THROUGHPUT TECHNOLOGIES AS DESCRIBED BY CMS-2020-01-R: HCPCS | Performed by: FAMILY MEDICINE

## 2022-11-08 PROCEDURE — 3E0P7VZ INTRODUCTION OF HORMONE INTO FEMALE REPRODUCTIVE, VIA NATURAL OR ARTIFICIAL OPENING: ICD-10-PCS | Performed by: FAMILY MEDICINE

## 2022-11-08 PROCEDURE — 250N000013 HC RX MED GY IP 250 OP 250 PS 637: Performed by: FAMILY MEDICINE

## 2022-11-08 PROCEDURE — 120N000001 HC R&B MED SURG/OB

## 2022-11-08 PROCEDURE — 86850 RBC ANTIBODY SCREEN: CPT | Performed by: FAMILY MEDICINE

## 2022-11-08 PROCEDURE — 85025 COMPLETE CBC W/AUTO DIFF WBC: CPT | Performed by: FAMILY MEDICINE

## 2022-11-08 PROCEDURE — 86901 BLOOD TYPING SEROLOGIC RH(D): CPT | Performed by: FAMILY MEDICINE

## 2022-11-08 RX ORDER — OXYTOCIN 10 [USP'U]/ML
10 INJECTION, SOLUTION INTRAMUSCULAR; INTRAVENOUS
Status: DISCONTINUED | OUTPATIENT
Start: 2022-11-08 | End: 2022-11-09 | Stop reason: HOSPADM

## 2022-11-08 RX ORDER — KETOROLAC TROMETHAMINE 30 MG/ML
30 INJECTION, SOLUTION INTRAMUSCULAR; INTRAVENOUS
Status: DISCONTINUED | OUTPATIENT
Start: 2022-11-08 | End: 2022-11-09

## 2022-11-08 RX ORDER — SODIUM CHLORIDE, SODIUM LACTATE, POTASSIUM CHLORIDE, CALCIUM CHLORIDE 600; 310; 30; 20 MG/100ML; MG/100ML; MG/100ML; MG/100ML
INJECTION, SOLUTION INTRAVENOUS CONTINUOUS
Status: DISCONTINUED | OUTPATIENT
Start: 2022-11-08 | End: 2022-11-09 | Stop reason: HOSPADM

## 2022-11-08 RX ORDER — CARBOPROST TROMETHAMINE 250 UG/ML
250 INJECTION, SOLUTION INTRAMUSCULAR
Status: DISCONTINUED | OUTPATIENT
Start: 2022-11-08 | End: 2022-11-09 | Stop reason: HOSPADM

## 2022-11-08 RX ORDER — NALOXONE HYDROCHLORIDE 0.4 MG/ML
0.4 INJECTION, SOLUTION INTRAMUSCULAR; INTRAVENOUS; SUBCUTANEOUS
Status: DISCONTINUED | OUTPATIENT
Start: 2022-11-08 | End: 2022-11-09 | Stop reason: HOSPADM

## 2022-11-08 RX ORDER — NALOXONE HYDROCHLORIDE 0.4 MG/ML
0.2 INJECTION, SOLUTION INTRAMUSCULAR; INTRAVENOUS; SUBCUTANEOUS
Status: DISCONTINUED | OUTPATIENT
Start: 2022-11-08 | End: 2022-11-09 | Stop reason: HOSPADM

## 2022-11-08 RX ORDER — METHYLERGONOVINE MALEATE 0.2 MG/ML
200 INJECTION INTRAVENOUS
Status: DISCONTINUED | OUTPATIENT
Start: 2022-11-08 | End: 2022-11-09 | Stop reason: HOSPADM

## 2022-11-08 RX ORDER — TRANEXAMIC ACID 10 MG/ML
1 INJECTION, SOLUTION INTRAVENOUS EVERY 30 MIN PRN
Status: DISCONTINUED | OUTPATIENT
Start: 2022-11-08 | End: 2022-11-09 | Stop reason: HOSPADM

## 2022-11-08 RX ORDER — HYDROXYZINE HYDROCHLORIDE 50 MG/1
100 TABLET, FILM COATED ORAL
Status: DISCONTINUED | OUTPATIENT
Start: 2022-11-08 | End: 2022-11-09 | Stop reason: HOSPADM

## 2022-11-08 RX ORDER — FENTANYL CITRATE 50 UG/ML
100 INJECTION, SOLUTION INTRAMUSCULAR; INTRAVENOUS
Status: DISCONTINUED | OUTPATIENT
Start: 2022-11-08 | End: 2022-11-09 | Stop reason: HOSPADM

## 2022-11-08 RX ORDER — PROCHLORPERAZINE MALEATE 5 MG
10 TABLET ORAL EVERY 6 HOURS PRN
Status: DISCONTINUED | OUTPATIENT
Start: 2022-11-08 | End: 2022-11-10 | Stop reason: HOSPADM

## 2022-11-08 RX ORDER — TERBUTALINE SULFATE 1 MG/ML
0.25 INJECTION, SOLUTION SUBCUTANEOUS
Status: DISCONTINUED | OUTPATIENT
Start: 2022-11-08 | End: 2022-11-09 | Stop reason: HOSPADM

## 2022-11-08 RX ORDER — OXYTOCIN 10 [USP'U]/ML
10 INJECTION, SOLUTION INTRAMUSCULAR; INTRAVENOUS
Status: DISCONTINUED | OUTPATIENT
Start: 2022-11-08 | End: 2022-11-09

## 2022-11-08 RX ORDER — ONDANSETRON 2 MG/ML
4 INJECTION INTRAMUSCULAR; INTRAVENOUS EVERY 6 HOURS PRN
Status: DISCONTINUED | OUTPATIENT
Start: 2022-11-08 | End: 2022-11-09 | Stop reason: HOSPADM

## 2022-11-08 RX ORDER — METOCLOPRAMIDE HYDROCHLORIDE 5 MG/ML
10 INJECTION INTRAMUSCULAR; INTRAVENOUS EVERY 6 HOURS PRN
Status: DISCONTINUED | OUTPATIENT
Start: 2022-11-08 | End: 2022-11-09 | Stop reason: HOSPADM

## 2022-11-08 RX ORDER — ONDANSETRON 4 MG/1
4 TABLET, ORALLY DISINTEGRATING ORAL EVERY 6 HOURS PRN
Status: DISCONTINUED | OUTPATIENT
Start: 2022-11-08 | End: 2022-11-09 | Stop reason: HOSPADM

## 2022-11-08 RX ORDER — OXYTOCIN/0.9 % SODIUM CHLORIDE 30/500 ML
340 PLASTIC BAG, INJECTION (ML) INTRAVENOUS CONTINUOUS PRN
Status: DISCONTINUED | OUTPATIENT
Start: 2022-11-08 | End: 2022-11-09 | Stop reason: HOSPADM

## 2022-11-08 RX ORDER — LIDOCAINE 40 MG/G
CREAM TOPICAL
Status: DISCONTINUED | OUTPATIENT
Start: 2022-11-08 | End: 2022-11-09 | Stop reason: HOSPADM

## 2022-11-08 RX ORDER — PROCHLORPERAZINE 25 MG
25 SUPPOSITORY, RECTAL RECTAL EVERY 12 HOURS PRN
Status: DISCONTINUED | OUTPATIENT
Start: 2022-11-08 | End: 2022-11-10 | Stop reason: HOSPADM

## 2022-11-08 RX ORDER — CITRIC ACID/SODIUM CITRATE 334-500MG
30 SOLUTION, ORAL ORAL
Status: DISCONTINUED | OUTPATIENT
Start: 2022-11-08 | End: 2022-11-09 | Stop reason: HOSPADM

## 2022-11-08 RX ORDER — OXYTOCIN/0.9 % SODIUM CHLORIDE 30/500 ML
100-340 PLASTIC BAG, INJECTION (ML) INTRAVENOUS CONTINUOUS PRN
Status: DISCONTINUED | OUTPATIENT
Start: 2022-11-08 | End: 2022-11-09

## 2022-11-08 RX ORDER — ACETAMINOPHEN 325 MG/1
650 TABLET ORAL EVERY 4 HOURS PRN
Status: DISCONTINUED | OUTPATIENT
Start: 2022-11-08 | End: 2022-11-09 | Stop reason: HOSPADM

## 2022-11-08 RX ORDER — MISOPROSTOL 200 UG/1
400 TABLET ORAL
Status: DISCONTINUED | OUTPATIENT
Start: 2022-11-08 | End: 2022-11-09 | Stop reason: HOSPADM

## 2022-11-08 RX ORDER — METOCLOPRAMIDE 5 MG/1
10 TABLET ORAL EVERY 6 HOURS PRN
Status: DISCONTINUED | OUTPATIENT
Start: 2022-11-08 | End: 2022-11-09 | Stop reason: HOSPADM

## 2022-11-08 RX ORDER — IBUPROFEN 800 MG/1
800 TABLET, FILM COATED ORAL
Status: DISCONTINUED | OUTPATIENT
Start: 2022-11-08 | End: 2022-11-09

## 2022-11-08 RX ADMIN — DINOPROSTONE 10 MG: 10 INSERT VAGINAL at 22:32

## 2022-11-08 ASSESSMENT — ACTIVITIES OF DAILY LIVING (ADL)
CHANGE_IN_FUNCTIONAL_STATUS_SINCE_ONSET_OF_CURRENT_ILLNESS/INJURY: NO
ADLS_ACUITY_SCORE: 18
ADLS_ACUITY_SCORE: 18
FALL_HISTORY_WITHIN_LAST_SIX_MONTHS: NO

## 2022-11-09 ENCOUNTER — ANESTHESIA EVENT (OUTPATIENT)
Dept: OBGYN | Facility: CLINIC | Age: 32
End: 2022-11-09
Payer: COMMERCIAL

## 2022-11-09 ENCOUNTER — ANESTHESIA (OUTPATIENT)
Dept: OBGYN | Facility: CLINIC | Age: 32
End: 2022-11-09
Payer: COMMERCIAL

## 2022-11-09 PROBLEM — Z37.9 NORMAL LABOR: Status: ACTIVE | Noted: 2022-11-09

## 2022-11-09 LAB
GLUCOSE BLDC GLUCOMTR-MCNC: 110 MG/DL (ref 70–99)
GLUCOSE BLDC GLUCOMTR-MCNC: 113 MG/DL (ref 70–99)
GLUCOSE BLDC GLUCOMTR-MCNC: 115 MG/DL (ref 70–99)
GLUCOSE BLDC GLUCOMTR-MCNC: 124 MG/DL (ref 70–99)
GLUCOSE BLDC GLUCOMTR-MCNC: 130 MG/DL (ref 70–99)
GLUCOSE BLDC GLUCOMTR-MCNC: 131 MG/DL (ref 70–99)
GLUCOSE BLDC GLUCOMTR-MCNC: 143 MG/DL (ref 70–99)
GLUCOSE BLDC GLUCOMTR-MCNC: 90 MG/DL (ref 70–99)
HOLD SPECIMEN: NORMAL
T PALLIDUM AB SER QL: NONREACTIVE

## 2022-11-09 PROCEDURE — 250N000013 HC RX MED GY IP 250 OP 250 PS 637: Performed by: FAMILY MEDICINE

## 2022-11-09 PROCEDURE — 722N000001 HC LABOR CARE VAGINAL DELIVERY SINGLE

## 2022-11-09 PROCEDURE — 36415 COLL VENOUS BLD VENIPUNCTURE: CPT | Performed by: OBSTETRICS & GYNECOLOGY

## 2022-11-09 PROCEDURE — 258N000003 HC RX IP 258 OP 636: Performed by: FAMILY MEDICINE

## 2022-11-09 PROCEDURE — 258N000003 HC RX IP 258 OP 636: Performed by: NURSE ANESTHETIST, CERTIFIED REGISTERED

## 2022-11-09 PROCEDURE — 250N000009 HC RX 250: Performed by: OBSTETRICS & GYNECOLOGY

## 2022-11-09 PROCEDURE — 86780 TREPONEMA PALLIDUM: CPT | Performed by: OBSTETRICS & GYNECOLOGY

## 2022-11-09 PROCEDURE — 3E0R3BZ INTRODUCTION OF ANESTHETIC AGENT INTO SPINAL CANAL, PERCUTANEOUS APPROACH: ICD-10-PCS | Performed by: OBSTETRICS & GYNECOLOGY

## 2022-11-09 PROCEDURE — 10907ZC DRAINAGE OF AMNIOTIC FLUID, THERAPEUTIC FROM PRODUCTS OF CONCEPTION, VIA NATURAL OR ARTIFICIAL OPENING: ICD-10-PCS | Performed by: OBSTETRICS & GYNECOLOGY

## 2022-11-09 PROCEDURE — 00HU33Z INSERTION OF INFUSION DEVICE INTO SPINAL CANAL, PERCUTANEOUS APPROACH: ICD-10-PCS | Performed by: OBSTETRICS & GYNECOLOGY

## 2022-11-09 PROCEDURE — 250N000011 HC RX IP 250 OP 636: Performed by: NURSE ANESTHETIST, CERTIFIED REGISTERED

## 2022-11-09 PROCEDURE — 59400 OBSTETRICAL CARE: CPT | Performed by: OBSTETRICS & GYNECOLOGY

## 2022-11-09 PROCEDURE — 250N000011 HC RX IP 250 OP 636: Performed by: FAMILY MEDICINE

## 2022-11-09 PROCEDURE — 250N000012 HC RX MED GY IP 250 OP 636 PS 637: Performed by: FAMILY MEDICINE

## 2022-11-09 PROCEDURE — 250N000009 HC RX 250: Performed by: NURSE ANESTHETIST, CERTIFIED REGISTERED

## 2022-11-09 PROCEDURE — 120N000001 HC R&B MED SURG/OB

## 2022-11-09 PROCEDURE — 370N000003 HC ANESTHESIA WARD SERVICE

## 2022-11-09 PROCEDURE — 0KQM0ZZ REPAIR PERINEUM MUSCLE, OPEN APPROACH: ICD-10-PCS | Performed by: OBSTETRICS & GYNECOLOGY

## 2022-11-09 RX ORDER — BUPIVACAINE HYDROCHLORIDE 2.5 MG/ML
INJECTION, SOLUTION EPIDURAL; INFILTRATION; INTRACAUDAL
Status: DISCONTINUED | OUTPATIENT
Start: 2022-11-09 | End: 2022-11-09

## 2022-11-09 RX ORDER — MISOPROSTOL 200 UG/1
400 TABLET ORAL
Status: DISCONTINUED | OUTPATIENT
Start: 2022-11-09 | End: 2022-11-10 | Stop reason: HOSPADM

## 2022-11-09 RX ORDER — NICOTINE POLACRILEX 4 MG
15-30 LOZENGE BUCCAL
Status: DISCONTINUED | OUTPATIENT
Start: 2022-11-09 | End: 2022-11-09 | Stop reason: HOSPADM

## 2022-11-09 RX ORDER — METHYLERGONOVINE MALEATE 0.2 MG/ML
200 INJECTION INTRAVENOUS
Status: DISCONTINUED | OUTPATIENT
Start: 2022-11-09 | End: 2022-11-10 | Stop reason: HOSPADM

## 2022-11-09 RX ORDER — SODIUM CHLORIDE 9 MG/ML
INJECTION, SOLUTION INTRAVENOUS CONTINUOUS
Status: DISCONTINUED | OUTPATIENT
Start: 2022-11-09 | End: 2022-11-09 | Stop reason: HOSPADM

## 2022-11-09 RX ORDER — LIDOCAINE HYDROCHLORIDE 10 MG/ML
INJECTION, SOLUTION INFILTRATION; PERINEURAL
Status: DISCONTINUED
Start: 2022-11-09 | End: 2022-11-09 | Stop reason: HOSPADM

## 2022-11-09 RX ORDER — BUPIVACAINE HYDROCHLORIDE 2.5 MG/ML
INJECTION, SOLUTION EPIDURAL; INFILTRATION; INTRACAUDAL PRN
Status: DISCONTINUED | OUTPATIENT
Start: 2022-11-09 | End: 2022-11-09

## 2022-11-09 RX ORDER — ACETAMINOPHEN 325 MG/1
650 TABLET ORAL EVERY 4 HOURS PRN
Status: DISCONTINUED | OUTPATIENT
Start: 2022-11-09 | End: 2022-11-10 | Stop reason: HOSPADM

## 2022-11-09 RX ORDER — FENTANYL CITRATE-0.9 % NACL/PF 10 MCG/ML
100 PLASTIC BAG, INJECTION (ML) INTRAVENOUS EVERY 5 MIN PRN
Status: DISCONTINUED | OUTPATIENT
Start: 2022-11-09 | End: 2022-11-09 | Stop reason: HOSPADM

## 2022-11-09 RX ORDER — MODIFIED LANOLIN
OINTMENT (GRAM) TOPICAL
Status: DISCONTINUED | OUTPATIENT
Start: 2022-11-09 | End: 2022-11-10 | Stop reason: HOSPADM

## 2022-11-09 RX ORDER — OXYTOCIN 10 [USP'U]/ML
10 INJECTION, SOLUTION INTRAMUSCULAR; INTRAVENOUS
Status: DISCONTINUED | OUTPATIENT
Start: 2022-11-09 | End: 2022-11-10 | Stop reason: HOSPADM

## 2022-11-09 RX ORDER — NICOTINE POLACRILEX 4 MG
15-30 LOZENGE BUCCAL
Status: DISCONTINUED | OUTPATIENT
Start: 2022-11-09 | End: 2022-11-10 | Stop reason: HOSPADM

## 2022-11-09 RX ORDER — OXYTOCIN/0.9 % SODIUM CHLORIDE 30/500 ML
340 PLASTIC BAG, INJECTION (ML) INTRAVENOUS CONTINUOUS PRN
Status: DISCONTINUED | OUTPATIENT
Start: 2022-11-09 | End: 2022-11-10 | Stop reason: HOSPADM

## 2022-11-09 RX ORDER — NALBUPHINE HYDROCHLORIDE 10 MG/ML
2.5-5 INJECTION, SOLUTION INTRAMUSCULAR; INTRAVENOUS; SUBCUTANEOUS EVERY 6 HOURS PRN
Status: DISCONTINUED | OUTPATIENT
Start: 2022-11-09 | End: 2022-11-09

## 2022-11-09 RX ORDER — OXYTOCIN/0.9 % SODIUM CHLORIDE 30/500 ML
1-24 PLASTIC BAG, INJECTION (ML) INTRAVENOUS CONTINUOUS
Status: DISCONTINUED | OUTPATIENT
Start: 2022-11-09 | End: 2022-11-09 | Stop reason: HOSPADM

## 2022-11-09 RX ORDER — DEXTROSE MONOHYDRATE 25 G/50ML
25-50 INJECTION, SOLUTION INTRAVENOUS
Status: DISCONTINUED | OUTPATIENT
Start: 2022-11-09 | End: 2022-11-09

## 2022-11-09 RX ORDER — DEXTROSE MONOHYDRATE 25 G/50ML
25-50 INJECTION, SOLUTION INTRAVENOUS
Status: DISCONTINUED | OUTPATIENT
Start: 2022-11-09 | End: 2022-11-09 | Stop reason: HOSPADM

## 2022-11-09 RX ORDER — NICOTINE POLACRILEX 4 MG
15-30 LOZENGE BUCCAL
Status: DISCONTINUED | OUTPATIENT
Start: 2022-11-09 | End: 2022-11-09

## 2022-11-09 RX ORDER — PRENATAL VIT/IRON FUM/FOLIC AC 27MG-0.8MG
1 TABLET ORAL DAILY
Status: DISCONTINUED | OUTPATIENT
Start: 2022-11-09 | End: 2022-11-09

## 2022-11-09 RX ORDER — LIDOCAINE HYDROCHLORIDE 20 MG/ML
INJECTION, SOLUTION EPIDURAL; INFILTRATION; INTRACAUDAL; PERINEURAL
Status: DISCONTINUED | OUTPATIENT
Start: 2022-11-09 | End: 2022-11-09

## 2022-11-09 RX ORDER — IBUPROFEN 800 MG/1
800 TABLET, FILM COATED ORAL EVERY 6 HOURS PRN
Status: DISCONTINUED | OUTPATIENT
Start: 2022-11-09 | End: 2022-11-10 | Stop reason: HOSPADM

## 2022-11-09 RX ORDER — DEXTROSE MONOHYDRATE 25 G/50ML
25-50 INJECTION, SOLUTION INTRAVENOUS
Status: DISCONTINUED | OUTPATIENT
Start: 2022-11-09 | End: 2022-11-10 | Stop reason: HOSPADM

## 2022-11-09 RX ORDER — HYDROCORTISONE 25 MG/G
CREAM TOPICAL 3 TIMES DAILY PRN
Status: DISCONTINUED | OUTPATIENT
Start: 2022-11-09 | End: 2022-11-10 | Stop reason: HOSPADM

## 2022-11-09 RX ORDER — DOCUSATE SODIUM 100 MG/1
100 CAPSULE, LIQUID FILLED ORAL DAILY
Status: DISCONTINUED | OUTPATIENT
Start: 2022-11-09 | End: 2022-11-10 | Stop reason: HOSPADM

## 2022-11-09 RX ORDER — LIDOCAINE HYDROCHLORIDE AND EPINEPHRINE 15; 5 MG/ML; UG/ML
INJECTION, SOLUTION EPIDURAL PRN
Status: DISCONTINUED | OUTPATIENT
Start: 2022-11-09 | End: 2022-11-09

## 2022-11-09 RX ORDER — SODIUM CHLORIDE, SODIUM LACTATE, POTASSIUM CHLORIDE, CALCIUM CHLORIDE 600; 310; 30; 20 MG/100ML; MG/100ML; MG/100ML; MG/100ML
INJECTION, SOLUTION INTRAVENOUS CONTINUOUS PRN
Status: DISCONTINUED | OUTPATIENT
Start: 2022-11-09 | End: 2022-11-09 | Stop reason: HOSPADM

## 2022-11-09 RX ORDER — CARBOPROST TROMETHAMINE 250 UG/ML
250 INJECTION, SOLUTION INTRAMUSCULAR
Status: DISCONTINUED | OUTPATIENT
Start: 2022-11-09 | End: 2022-11-10 | Stop reason: HOSPADM

## 2022-11-09 RX ORDER — BISACODYL 10 MG
10 SUPPOSITORY, RECTAL RECTAL DAILY PRN
Status: DISCONTINUED | OUTPATIENT
Start: 2022-11-09 | End: 2022-11-10 | Stop reason: HOSPADM

## 2022-11-09 RX ORDER — TRANEXAMIC ACID 10 MG/ML
1 INJECTION, SOLUTION INTRAVENOUS EVERY 30 MIN PRN
Status: DISCONTINUED | OUTPATIENT
Start: 2022-11-09 | End: 2022-11-10 | Stop reason: HOSPADM

## 2022-11-09 RX ADMIN — Medication 10 ML/HR: at 08:54

## 2022-11-09 RX ADMIN — FENTANYL CITRATE 100 MCG: 50 INJECTION INTRAMUSCULAR; INTRAVENOUS at 07:44

## 2022-11-09 RX ADMIN — SODIUM CHLORIDE: 9 INJECTION, SOLUTION INTRAVENOUS at 10:01

## 2022-11-09 RX ADMIN — SODIUM CHLORIDE, POTASSIUM CHLORIDE, SODIUM LACTATE AND CALCIUM CHLORIDE: 600; 310; 30; 20 INJECTION, SOLUTION INTRAVENOUS at 09:49

## 2022-11-09 RX ADMIN — SODIUM CHLORIDE: 9 INJECTION, SOLUTION INTRAVENOUS at 10:02

## 2022-11-09 RX ADMIN — LIDOCAINE HYDROCHLORIDE 4 ML: 20 INJECTION, SOLUTION EPIDURAL; INFILTRATION; INTRACAUDAL; PERINEURAL at 09:00

## 2022-11-09 RX ADMIN — LIDOCAINE HYDROCHLORIDE,EPINEPHRINE BITARTRATE 3 ML: 15; .005 INJECTION, SOLUTION EPIDURAL; INFILTRATION; INTRACAUDAL; PERINEURAL at 08:46

## 2022-11-09 RX ADMIN — BUPIVACAINE HYDROCHLORIDE 5 ML: 2.5 INJECTION, SOLUTION EPIDURAL; INFILTRATION; INTRACAUDAL at 08:53

## 2022-11-09 RX ADMIN — Medication 2 MILLI-UNITS/MIN: at 09:35

## 2022-11-09 RX ADMIN — SODIUM CHLORIDE, POTASSIUM CHLORIDE, SODIUM LACTATE AND CALCIUM CHLORIDE 1000 ML: 600; 310; 30; 20 INJECTION, SOLUTION INTRAVENOUS at 08:48

## 2022-11-09 RX ADMIN — BUPIVACAINE HYDROCHLORIDE 5 ML: 2.5 INJECTION, SOLUTION EPIDURAL; INFILTRATION; INTRACAUDAL at 08:51

## 2022-11-09 RX ADMIN — MISOPROSTOL 800 MCG: 200 TABLET ORAL at 12:09

## 2022-11-09 RX ADMIN — SODIUM CHLORIDE, POTASSIUM CHLORIDE, SODIUM LACTATE AND CALCIUM CHLORIDE 1000 ML: 600; 310; 30; 20 INJECTION, SOLUTION INTRAVENOUS at 07:31

## 2022-11-09 ASSESSMENT — ACTIVITIES OF DAILY LIVING (ADL)
ADLS_ACUITY_SCORE: 18
ADLS_ACUITY_SCORE: 19
ADLS_ACUITY_SCORE: 18
ADLS_ACUITY_SCORE: 19
ADLS_ACUITY_SCORE: 18
ADLS_ACUITY_SCORE: 18
ADLS_ACUITY_SCORE: 19
ADLS_ACUITY_SCORE: 18
ADLS_ACUITY_SCORE: 18

## 2022-11-09 NOTE — PROGRESS NOTES
S: Delivery; late entry  B: induced Labor,  @ 58hmb2lshd gestation, GBS negative with antibiotic treatment. IDDM. Insulin pump started at 1002.   A: Patient delivered Vaginal at 1202 with Dr. Hester in attendance. Client with history of shoulder dystocia; non present with this delivery. Second degree tear repaired. Baby delivered and placed on mother's low abdomen for delayed cord clamping where baby was dried and stimulated. After cord clamped and cut, baby was placed skin to skin on mother's chest within 2 minutes following delivery . Apgars 9/9. Placenta was delivered @ 1205 followed by administration of (IV)oxytocin. Bonding initiated with mom and baby. Educated mother on importance of exclusive breast feeding, expected feeding readiness cues and encouraged her to observe for feeding cues. Mother informed that breast feeding assistance would be provided. See flowsheet for VS and PP checks. Labor care plan goals met.  R: Expect routine postpartum care. Anticipate first feeding within the hour.

## 2022-11-09 NOTE — PROGRESS NOTES
Dr. Roca here to perform AROM at 0915, fluid color  clear3 and small amount noted. Cervix is now 7 cm. Client comfortable from epidural  Patient repositioned and FHR stable after procedure. Contraction pattern spaced out; will initiated the pitocin protocol and start insulin drip since repeat . Will continue to observe for progress and recheck cervix as needed.

## 2022-11-09 NOTE — PROGRESS NOTES
SVE at 0725 client has been pacing in the room and breathing through contractions. 5 cm,90%,0 station; cervadil removed. LR infusion started and CRNA paged for epidural. Dr. Roca covering for Dr. Hester and will be informed.

## 2022-11-09 NOTE — ANESTHESIA PROCEDURE NOTES
Epidural catheter Procedure Note    Pre-Procedure   Staff -        Resident/Fellow: Toni Diamond MD       CRNA: Lanette Medley APRN CRNA       Other Anesthesia Staff: Jigna Jonas       Performed By: MIRI       Location: OB       Pre-Anesthestic Checklist: patient identified, IV checked, risks and benefits discussed, informed consent, monitors and equipment checked, pre-op evaluation, at physician/surgeon's request and post-op pain management  Timeout:       Correct Patient: Yes        Correct Procedure: Yes        Correct Site: Yes        Correct Position: Yes   Procedure Documentation  Procedure: epidural catheter       Patient Position: sitting       Patient Prep/Sterile Barriers: sterile gloves, mask, patient draped       Skin prep: Chloraprep       Local skin infiltrated with mL of 2% lidocaine.        Insertion Site: L3-4.       Technique: LORT saline        Needle Type: IV Cathether       Needle Gauge: 17.        Needle Length (Inches): 3.5        Catheter: 18 G.          Catheter threaded easily.         5 cm epidural space.         Threaded 11 cm at skin.         # of attempts: 1 and  # of redirects:  1    Assessment/Narrative         Paresthesias: No.       Test dose of 3 mL lidocaine 1.5% w/ 1:200,000 epinephrine at 08:46 CST.         Test dose negative, 3 minutes after injection, for signs of intravascular, subdural, or intrathecal injection.       Insertion/Infusion Method: LORT saline       Aspiration negative for Heme or CSF via Epidural Catheter.       Sensory Level Left: T6.       Sensory Level Right: T6.    Medication(s) Administered   Lidocaine 2% (Epidural) - EPIDURAL   4 mL - 11/9/2022 9:00:00 AM   Comments:  Bilateral T6 level.    Lidocaine given with patient sitting up due to lack of lower pain control with contractions. Patient more comfortable after bolus given. Placed per Jigna CHERY under the direct supervision of Lanette Medley CRNA as per note above without difficulty.       FOR [facility]  "([facility]) ONLY:   Pain Team Contact information: please page the Pain Team Via [employer]. Search \"Pain\". During daytime hours, please page the attending first. At night please page the resident first.    "

## 2022-11-09 NOTE — PROGRESS NOTES
7/80/0 - very soft cervix.  Head engaged with bulging bag. AROM with clear fluid.    Adequate pelvic outlet.  No perineal lesions.     Anticipate .

## 2022-11-09 NOTE — H&P
[facility] Labor and Delivery History and Physical    Destinee Caba MRN# [id number]   Age: 31 year old YOB: 1990     Date of Admission:  2022    Primary care provider: No Ref-Primary, Physician           Chief Complaint:   Destinee Caba is a 31 year old female with  at 39w5d pregnant who was admitted last night for induction of labor, indication insulin-dependent diabetes, term pregnancy with history of macrosomia and history of fourth degree tear.  I received sign out from Dr. Hester and the nursing staff.  Gillespie score at admission was 3.  She has got irregular uterine contraction; Cervidil was used for cervical ripening.  Did well overnight.  Vital signs were stable and normal.  Blood sugar was stable, insulin drip was not initiated.  NST has been reactive.    Having more painful contraction, about an hour ago, cervical check by nursing staff was 5/90%/0 station.  Cervidil removed.  She continued to be very uncomfortable with painful contractions.  Also feel increased pelvic pressure with the contraction.    Normal fetal movement.  No abnormal discharge or bleeding.  No leakage. No UTI symptoms.  No headache, dizziness, or acute change in her vision.  No runny nose, nasal congestion,  coughing, fever or chill. No N/V/D/C.      Good prenatal care.  Pregnancy was complicated with insulin-dependent diabetes.  Started insulin at about 18-week gestation.  Diabetes has been well controlled.  MAURICIO was based LMP with consistent early ultrasound MAURICIO. GBS was negative.  Prenatal lab was normal -negative hep B, hep C and HIV.  He is immune to rubella.  Labs yesterday show she is mildly anemic with hemoglobin at 10.8 and hematocrit 34%.  She did not get her Tdap, COVID nor influenza vaccination.    Last growth ultrasound was 3 weeks ago and estimate fetal weight at that time 3.4 kg, consistent at 70 with 57 percentile.    Has a history of asthma which has been controlled,  using the rescue inhaler rarely.    She is .  She had 4 vaginal delivery.  Her first delivery in 2017 with macrosomia fetal weight of 8 pounds and 15 ounces.  Delivery was complicated with fourth degree tear.  Second delivery was in 2019 at 38 weeks and 3-day, baby was 7 pounds 11 ounces, with secondary perineal laceration.  Last baby was delivered in  at 38 weeks and 6 days, fetal weight was 8 pounds 1 ounces.  All pregnancy was complicated with gestational diabetes.            Pregnancy history:     OBSTETRIC HISTORY:    OB History    Para Term  AB Living   5 3 3 0 1 3   SAB IAB Ectopic Multiple Live Births   1 0 0 0 3      # Outcome Date GA Lbr Raffaele/2nd Weight Sex Delivery Anes PTL Lv   5 Current            4 Term 20 38w6d  3.657 kg (8 lb 1 oz) M    YULISSA      Complications: Gestational diabetes      Name: Frederick   3 Term 19 38w3d  3.487 kg (7 lb 11 oz) F   N YULISSA      Complications: Gestational diabetes      Name: Aviva   2 Term 17 39w6d  4.05 kg (8 lb 14.9 oz) M Vag-Spont   YULISSA      Birth Comments: 4th degree, GDM A2      Complications: Shoulder Dystocia, Gestational diabetes      Apgar1: 8  Apgar5: 9   1 SAB 2016 7w0d             Obstetric Comments   EDC 2022 based on est. LMP.   to Chriss.  This will be their [facility] delivery at [facility]       EDC: Estimated Date of Delivery: 22    Prenatal Labs:   Lab Results   Component Value Date    AS Negative 2022    HEPBANG Nonreactive 2022    HGB 10.8 (L) 2022       GBS Status:   No results found for: GBS    Active Problem List  Patient Active Problem List   Diagnosis     Normal pregnancy in third trimester     Insulin controlled gestational diabetes mellitus (GDM) in third trimester     History of shoulder dystocia in prior pregnancy, currently pregnant     History of fourth degree perineal laceration     Mild asthma     Encounter for induction of labor     Allergic rhinitis        Medication Prior to Admission  Facility-Administered Medications Prior to Admission   Medication Dose Route Frequency Provider Last Rate Last Admin     hydrOXYzine (ATARAX) tablet 50 mg  50 mg Oral At Bedtime PRN Flaquito Hester MD         insulin NPH injection 38 Units  38 Units Subcutaneous At Bedtime Flaquito Hester MD         lactated ringers infusion   Intravenous Continuous PRN Flaquito Hester MD         lidocaine (LMX4) kit   Topical Q1H PRN Flaquito Hester MD         lidocaine 1 % 0.1-1 mL  0.1-1 mL Other Q1H PRN Flaquito Hester MD         Medication Instructions - cervical ripening and induction medications   Does not apply Continuous PRN Flaquito Hester MD         Medication Instructions - cervical ripening and induction medications   Does not apply Continuous PRN Flaquito Hester MD         misoprostol (cervical ripening) (CYTOTEC) quarter-tab 25 mcg  25 mcg Vaginal Q4H PRN Flaquito Hester MD         oxytocin (PITOCIN) 30 units in 500 mL 0.9% NaCl infusion  1-24 abhishek-units/min Intravenous Continuous Flaquito Hester MD         oxytocin (PITOCIN) 30 units in 500 mL 0.9% NaCl infusion  1-24 abhishek-units/min Intravenous Continuous Flaquito Hester MD         sodium chloride (PF) 0.9% PF flush 3 mL  3 mL Intracatheter Q8H Flaquito Hester MD         sodium chloride (PF) 0.9% PF flush 3 mL  3 mL Intracatheter q1 min prn Flaquiot Hester MD         terbutaline (BRETHINE) injection 0.25 mg  0.25 mg Subcutaneous Once PRN Flaquito Hester MD         Medications Prior to Admission   Medication Sig Dispense Refill Last Dose     albuterol (PROAIR HFA/PROVENTIL HFA/VENTOLIN HFA) 108 (90 Base) MCG/ACT inhaler Inhale 1-2 puffs into the lungs PRN per pt   More than a month     insulin  UNIT/ML injection Inject 30-50 Units Subcutaneous At Bedtime Take 30 units at bedtime, as long as no lows <70, increase by 1 unit daily to maintain fasting BG <95. 60  "mL 3 11/7/2022 at 2200     insulin pen needle (BD ELBERT U/F) 32G X 4 MM miscellaneous Use 1-2 pen needles daily or as directed. 200 each 3 11/7/2022 at 2200     Loratadine (CLARITIN PO)    Past Week at 0900     Prenatal Vit-Fe Fumarate-FA (PRENATAL MULTIVITAMIN W/IRON) 27-0.8 MG tablet Take 1 tablet by mouth daily   11/8/2022 at 0900   .        Maternal Past Medical History:     Past Medical History:   Diagnosis Date     History of asthma     \"seasonal\"     History of gestational diabetes     \"high pre-diabetic level vs. type 2 per endocrinologist\"                       Family History:     Family History   Problem Relation Age of Onset     Obesity Mother      Diabetes Mother         type 2     Obesity Father      Diabetes Father         type 2     Asthma Brother      No Known Problems Daughter      No Known Problems Son      No Known Problems Son      Family history reviewed and updated in [facility]            Social History:     Social History     Tobacco Use     Smoking status: Never     Smokeless tobacco: Never   Substance Use Topics     Alcohol use: Not Currently            Review of Systems:   The Review of Systems is negative other than noted in the HPI          Physical Exam:     Vitals were reviewed  Patient Vitals for the past 12 hrs:   BP Temp Temp src Pulse Resp   11/09/22 0823 121/84 97.9  F (36.6  C) Oral -- 18   11/09/22 0519 125/79 98.7  F (37.1  C) Oral 98 17   11/09/22 0110 108/54 98.2  F (36.8  C) Oral -- 16   11/08/22 2330 134/83 -- -- -- --   11/08/22 2230 -- 98.1  F (36.7  C) Oral -- --     Constitutional:   Alert, pleasant, no acute distress in bed to the contraction.  Was in a lot of pain and pelvic pressure with the contractions.     Lungs:   Clear, no wheezes or crackle.     Cardiovascular:   Regular rate and rhythm, no murmur.     Abdomen:   Large for gestational age, estimated fetal weight about 9 pounds.  No tender with palpation to the fundus.  No CVA tenderness.     Genitounirinary: Performed " by nursing staff.  I will check her cervix after the epidural placement.   External Genitalia: Per nursing staff, general appearance; normal, Hair distribution; normal, Lesions absent     Musculoskeletal:   no lower extremity pitting edema present      Cervix:   Membranes: intact   Dilation: 5   Effacement: 80%   Station:0   Consistency: soft   Position: Mid  Presentation:Cephalic  Fetal Heart Rate Tracing: reactive and reassuring, variables moderate, decelerations occasional early, normal acceleration, tier 1 (normal)  Tocometer: external monitor (Jennifer) and irregular contraction every to 4 minutes                       Assessment:   Destinee Caba is a 39w5d pregnant female was admitted overnight for induction due to insulin-dependent diabetes, term pregnancy with history of macrosomia and history of fourth degree tear.  She has Cervidil for cervical ripening overnight.  She is now in active labor with frequent, painful contractions and cervix change of 5/90/0.  NST has been reactive.  Vital signs have been normal.  Blood sugar has been stable and normal - insulin drip noted.  Her diabetes has been controlled, started insulin at around 18-week gestation.  She had  insulin-dependent gestational diabetes withher last 3 pregnancies.  Also has a history large baby, first baby was 8# 15 oz with 4 degree tear.  The last 2 deliveries was not macrosomic, weights were around 8 pound at around 35.5 weeks gestation.    My estimated fetal weight for this baby is around 9 pounds.  Ultrasound 3 weeks ago to suggested of around 7.5 pounds, consistent at 70th percentile.    I discussed with her about the potential complication associated with shoulder dystocia and macrosomia.  No indication for elective  except of history 4 degree tear with her first pregnancy.  Her last 2 vaginal deliveries with second-degree laceration.  She feels comfortable with pursuing vaginal delivery.            Plan:   Admit - see IP  orders  Labor induction with Cervidil overnight   -  Consider augmentation with AROM after epidural   -  Pitocin with inadequate contractions  Pain medication: epidural  Intrapartum care and discussed in details.  BS management per protocol - will start insulin drip if indicated  Discussed about indication for episiotomy, vacuum-assisted delivery and/or .  Discussed about shoulder dystocia   -Nursing staff was informed about his preparation   -Request at least 2 different nurses during the delivery   -Will have another OB provider to standby  Discussed about indication for blood transfusion.       She is okay to be transfused if necessary.       -Blood type and screen    Aggressive with postpartum hemorrhage prevention.    - Avoid Hemabate due to history of asthma.      - No contraindication for Methergine  EFW: 9 pounds.  Likely to have adequate pelvic outlet due to history of delivery of large babies   - Will evaluate myself after the epidural displacement.  Anticipate     Postpartum hemorrhage risk: Mild.    A/P discussed this with patient and her  in details.  All of their questions was answered.    Negrito Mckeon Mai, MD

## 2022-11-09 NOTE — PROGRESS NOTES
S: augmentation of labor  B: Patient is a  who presents for scheduled induction of labor @ 39w5d gestation.IDDM  EFW 8#6 oz  A: IV Oxytocin per OB protocol beginning at 2 mU/min at 0935. Patient has contractions every 9 min. Lasting 50 sec. Pt rates her pain at a  0/10. Understands that she will be on continuous EFM throughout induction. FHR baseline is 145 with moderate variability. Patient agrees with plan of care.   R: Will observe for active phase of labor and fetal tolerance.

## 2022-11-09 NOTE — PROGRESS NOTES
Dr Hester called for an update. Requested orders for insulin and pitocin if needed. Orders to start pitocin when cervidil removed. Dr Hester to look at insulin orders, plan to hold insulin for now.

## 2022-11-09 NOTE — ANESTHESIA PREPROCEDURE EVALUATION
"Anesthesia Pre-Procedure Evaluation    Patient: Destinee Caba   MRN: [id number] : 1990        Procedure :           Past Medical History:   Diagnosis Date     History of asthma     \"seasonal\"     History of gestational diabetes     \"high pre-diabetic level vs. type 2 per endocrinologist\"      Past Surgical History:   Procedure Laterality Date     WISDOM TOOTH EXTRACTION        No Known Allergies   Social History     Tobacco Use     Smoking status: Never     Smokeless tobacco: Never   Substance Use Topics     Alcohol use: Not Currently      Wt Readings from Last 1 Encounters:   22 86.7 kg (191 lb 1.6 oz)        Anesthesia Evaluation   Pt has had prior anesthetic. Type: Regional.        ROS/MED HX  ENT/Pulmonary:     (+) asthma     Neurologic:  - neg neurologic ROS     Cardiovascular:  - neg cardiovascular ROS     METS/Exercise Tolerance:     Hematologic:  - neg hematologic  ROS     Musculoskeletal:       GI/Hepatic:  - neg GI/hepatic ROS     Renal/Genitourinary:       Endo:  - neg endo ROS   (+) gestational diabetes and Insulin,    Psychiatric/Substance Use:  - neg psychiatric ROS     Infectious Disease:       Malignancy:       Other:            Physical Exam    Airway        Mallampati: II   TM distance: > 3 FB   Neck ROM: full   Mouth opening: > 3 cm    Respiratory Devices and Support         Dental  no notable dental history         Cardiovascular   cardiovascular exam normal          Pulmonary   pulmonary exam normal                OUTSIDE LABS:  CBC:   Lab Results   Component Value Date    WBC 8.4 2022    WBC 6.7 2022    HGB 10.8 (L) 2022    HGB 12.7 2022    HCT 34.0 (L) 2022    HCT 38.9 2022     2022     2022     BMP:   Lab Results   Component Value Date    GLC 90 2022    GLC 92 2022     COAGS: No results found for: PTT, INR, FIBR  POC: No results found for: BGM, HCG, HCGS  HEPATIC: No results found for: ALBUMIN, " PROTTOTAL, ALT, AST, GGT, ALKPHOS, BILITOTAL, BILIDIRECT, ALLEN  OTHER:   Lab Results   Component Value Date    A1C 5.9 (H) 05/23/2022       Anesthesia Plan    ASA Status:  2      Anesthesia Type: Epidural.              Consents    Anesthesia Plan(s) and associated risks, benefits, and realistic alternatives discussed. Questions answered and patient/representative(s) expressed understanding.    - Discussed:     - Discussed with:  Patient         Postoperative Care            Comments:    Other Comments: The risks and benefits of labor analgesia, and the alternatives where applicable, have been discussed with the patient, and they wish to proceed.       neg OB ROS.       Yamel Rutledge, APRN CRNA

## 2022-11-09 NOTE — PROGRESS NOTES
S: Patient desires Epidural  B: Patient is a  who presented for induction of  Labor for IDDM, She is now 5 dialated, FHT's 135  A: Lanette CHRISTOPHER CRNA called and in room at 0825. Patient and procedure correctly identified/verified with CRNA. Consent signed. 1000 ml fluid bolus given. Patient in position for epidural placement. Epidural placed without complications. Test dose/bolus given by CRNA at 0846, epidural dose at 0851, pump started at 0854 and patient tolerated well. Additional med at 0903 and 0907. Patient rates her pain after procedure as 0/10.  R: Will continue to monitor.

## 2022-11-09 NOTE — PROGRESS NOTES
Cervix complete at 1050. Dr. Hester called in his clinic and will come immediately for delivery. Dr. Roca also to unit and available as needed

## 2022-11-09 NOTE — L&D DELIVERY NOTE
Delivery Summary    Destinee Caba MRN# [id number]   Age: 31 year old YOB: 1990     Destinee Caba is a 31 y.o. femalle  who presented for induction of labor who is now .     Cervical ripening with misoprostol was completed at 2230 on 22. The patient did well overnight with contractions every 4-5 minutes. AROM was completed at 0915 with rupture of clear fluid with some meconium. She completed the 1st stage of labor at 1052 after 1 hour and 38 minutes. The patient received an epidural for pain.     Delivery was uncomplicated. The second stage of labor lasted 1 hour and 10 minutes. She had a 2nd degree tear. Repaired with vicryl.     Placenta intact. EBL was 200+.         Naima Caba [[id number]]    Labor Event Times    Labor onset date: 22 Onset time:  9:14 AM   Dilation complete date: 22 Complete time: 10:52 AM   Start pushing date/time: 2022 1134      Labor Length    1st Stage (hrs): 1 (min): 38   2nd Stage (hrs): 1 (min): 10   3rd Stage (hrs): 0 (min): 3      Labor Events     labor?: No   steroids: None  Labor Type: Induction/Cervical ripening  Predominate monitoring during 1st stage: continuous electronic fetal monitoring     Antibiotics received during labor?: No     Rupture date/time: 22 0915   Rupture type: Artificial Rupture of Membranes  Fluid color: Clear, Meconium  Fluid odor: Normal     Induction: Misoprostol  Induction date/time:     Cervical ripening date/time: 22 2230   Indications for induction: Elective     Augmentation: Oxytocin, AROM  1:1 continuous labor support provided by?: RN Labor partogram used?: no      Delivery/Placenta Date and Time    Delivery Date: 22 Delivery Time: 12:02 PM   Placenta Date/Time: 2022 12:05 PM  Oxytocin given at the time of delivery: after delivery of placenta  Delivering clinician: Flaquito Hester MD   Other personnel present at delivery:  Provider Role    Flaquito Hester MD Jurek, Lucy, RN Registered Nurse   Lindsey Sanders RN Registered Nurse   Mayda Ervin Medical Student         Vaginal Counts     Initial count performed by 2 team members:  Two Team Members   PETRA Olguin RN       Needles Suture Needles Sponges (RETIRED) Instruments   Initial counts 2  5    Added to count  1     Relief counts       Final counts 2 1 5          Placed during labor Accounted for at the end of labor   FSE No No   IUPC No No   Cervidil No No              Final count performed by 2 team members:  Two Team Members   Dr. Mir Martin, PETRA      Final count correct?: Yes     Apgars    Living status: Living   1 Minute 5 Minute 10 Minute 15 Minute 20 Minute   Skin color:        Heart rate:        Reflex irritability:        Muscle tone:        Respiratory effort:        Total:           Cord    Vessels: 3 Vessels    Cord Complications: None               Cord Blood Disposition: Lab    Gases Sent?: No    Delayed cord clamping?: Yes    Cord Clamping Delay (seconds):  seconds    Stem cell collection?: No       Labor Events and Shoulder Dystocia    Fetal Tracing Prior to Delivery: Category 1  Shoulder dystocia present?: Neg     Delivery (Maternal) (Provider to Complete) ([id number])    Episiotomy: None  Perineal lacerations: 2nd Repaired?: Yes   Repair suture: 3-0 Vicryl  Number of repair packets: 1  Genital tract inspection done: Pos     Blood Loss  Mother: Destinee Caba #[id number]   Start of Mother's Information    Delivery Blood Loss  11/09/22 0914 - 11/09/22 1223    None           End of Mother's Information  Mother: Destinee Caba #[id number]          Delivery - Provider to Complete ([id number])    Delivering clinician: Flaquito Hester MD  Attempted Delivery Types (Choose all that apply): Spontaneous Vaginal Delivery  Delivery Type (Choose the 1 that will go to the Birth History): Vaginal, Spontaneous                   Other personnel:  Provider Role    Flaquito Hester MD Jurek, Lucy, RN Registered Nurse   Lindsey Sanders RN Registered Nurse   Mayda Ervin Medical Student                Placenta    Date/Time: 11/9/2022 12:05 PM  Removal: Spontaneous  Disposition: Hospital disposal           Anesthesia    Method: INTRAVENOUS , Epidural  Cervical dilation at placement: 4-7                Presentation and Position    Presentation: Vertex    Position: Left Occiput Anterior                 Flaquito Hester MD

## 2022-11-09 NOTE — TELEPHONE ENCOUNTER
"Flaquito Hseter MD Martin, Katie J RN  Cc: [facility] Triage  Phone Number: [phone]     \"Just an FYI,     Talked to Destinee on Saturday and scheduled her for an induction today.   She's at 5 cm :)\"    Canceled pt's prenatal appt for today.    Mellisa Jimenez RN    "

## 2022-11-09 NOTE — PROGRESS NOTES
S: Transfer to postpartum  B: Vaginal birth @ 1202, 2nd tear, with repair, breast feeding    A: Mother and baby transferred to postpartum unit at 1610 via walking after completion of immediate recovery period. Patient oriented to room. Mother and baby bonding well and in satisfactory condition upon transfer.  R: Anticipate routine postpartum care.

## 2022-11-09 NOTE — PROGRESS NOTES
S:  Admission  B:  Destinee is a  @ 39w4d gestation, GBS negative, Hep. B neg, pregnancy uncomplicated. EFW 8.5 per MD  A:  Patient admitted to room 333 for cervadil induction  R: start orderset

## 2022-11-09 NOTE — PROGRESS NOTES
"Will hold bedtime NPH because of blood glucose of 92. Patient concerned because last few nights she has been \"running low at night\". Will have a fasting blood sugar in the am  "

## 2022-11-09 NOTE — PROGRESS NOTES
Dr. Roca at bedside; aware of . Client reports eating energy bar 30 minutes prior. Dr. Roca gave verbal order to hold on insulin drip and recheck BG in and hour. Plan is to place epidural and then he will reassess cervix and perform AROM; client in agreement with plan.

## 2022-11-09 NOTE — PROGRESS NOTES
1110; CRNA informed of mec stained fluid and that may possibly get a call as needed after delivery.

## 2022-11-10 VITALS
TEMPERATURE: 98.3 F | SYSTOLIC BLOOD PRESSURE: 113 MMHG | HEART RATE: 84 BPM | OXYGEN SATURATION: 98 % | DIASTOLIC BLOOD PRESSURE: 85 MMHG | RESPIRATION RATE: 16 BRPM

## 2022-11-10 LAB
GLUCOSE BLDC GLUCOMTR-MCNC: 96 MG/DL (ref 70–99)
HGB BLD-MCNC: 9.5 G/DL (ref 11.7–15.7)

## 2022-11-10 PROCEDURE — 36415 COLL VENOUS BLD VENIPUNCTURE: CPT | Performed by: OBSTETRICS & GYNECOLOGY

## 2022-11-10 PROCEDURE — 85018 HEMOGLOBIN: CPT | Performed by: OBSTETRICS & GYNECOLOGY

## 2022-11-10 RX ORDER — ACETAMINOPHEN 325 MG/1
650 TABLET ORAL EVERY 4 HOURS PRN
COMMUNITY
Start: 2022-11-10

## 2022-11-10 RX ORDER — IBUPROFEN 800 MG/1
800 TABLET, FILM COATED ORAL EVERY 6 HOURS PRN
COMMUNITY
Start: 2022-11-10

## 2022-11-10 ASSESSMENT — ACTIVITIES OF DAILY LIVING (ADL)
ADLS_ACUITY_SCORE: 18
ADLS_ACUITY_SCORE: 19
ADLS_ACUITY_SCORE: 18
ADLS_ACUITY_SCORE: 19
ADLS_ACUITY_SCORE: 18
ADLS_ACUITY_SCORE: 19
ADLS_ACUITY_SCORE: 19
ADLS_ACUITY_SCORE: 18

## 2022-11-10 NOTE — DISCHARGE SUMMARY
[facility]    Destinee Caba MRN# [id number]   Age: 31 year old YOB: 1990     Date of Admission:  11/8/2022  Date of Discharge::  11/10/2022  Admitting Physician:  Flaquito Hester MD  Discharge Physician:  Refugio Gallo MD            Admission Diagnoses:   Encounter for induction of labor [Z34.90]  Normal labor [O80, Z37.9]  Principal Problem:    Encounter for induction of labor  Active Problems:    Normal pregnancy in third trimester    Insulin controlled gestational diabetes mellitus (GDM) in third trimester    History of shoulder dystocia in prior pregnancy, currently pregnant    History of fourth degree perineal laceration    Mild asthma    Normal labor               Discharge Diagnosis:     Principal Problem:    Encounter for induction of labor  Active Problems:    Normal pregnancy in third trimester    Insulin controlled gestational diabetes mellitus (GDM) in third trimester    History of shoulder dystocia in prior pregnancy, currently pregnant    History of fourth degree perineal laceration    Mild asthma    Normal labor               Procedures:     Procedure(s):             Medications Prior to Admission:     Facility-Administered Medications Prior to Admission   Medication Dose Route Frequency Provider Last Rate Last Admin     hydrOXYzine (ATARAX) tablet 50 mg  50 mg Oral At Bedtime PRN Flaquito Hester MD         Medication Instructions - cervical ripening and induction medications   Does not apply Continuous PRN Flaquito Hester MD         Medication Instructions - cervical ripening and induction medications   Does not apply Continuous PRFlaquito Finch MD         [DISCONTINUED] insulin NPH injection 38 Units  38 Units Subcutaneous At Bedtime Flaquito Hester MD         [DISCONTINUED] lactated ringers infusion   Intravenous Continuous PRN Flaquito Hester MD         [DISCONTINUED] lidocaine (LMX4) kit   Topical Q1H PRN Brown,  Flaquito March MD         [DISCONTINUED] lidocaine 1 % 0.1-1 mL  0.1-1 mL Other Q1H PRN Flaquito Hester MD         [DISCONTINUED] misoprostol (cervical ripening) (CYTOTEC) quarter-tab 25 mcg  25 mcg Vaginal Q4H PRN Flaquito Hester MD         [DISCONTINUED] oxytocin (PITOCIN) 30 units in 500 mL 0.9% NaCl infusion  1-24 abhishek-units/min Intravenous Continuous Flaquito Hester MD         [DISCONTINUED] oxytocin (PITOCIN) 30 units in 500 mL 0.9% NaCl infusion  1-24 abhishek-units/min Intravenous Continuous Flaquito Hester MD         [DISCONTINUED] sodium chloride (PF) 0.9% PF flush 3 mL  3 mL Intracatheter Q8H Flaquito Hester MD         [DISCONTINUED] sodium chloride (PF) 0.9% PF flush 3 mL  3 mL Intracatheter q1 min prn Flaquito Hester MD         [DISCONTINUED] terbutaline (BRETHINE) injection 0.25 mg  0.25 mg Subcutaneous Once PRN Flaquito Hester MD         Medications Prior to Admission   Medication Sig Dispense Refill Last Dose     albuterol (PROAIR HFA/PROVENTIL HFA/VENTOLIN HFA) 108 (90 Base) MCG/ACT inhaler Inhale 1-2 puffs into the lungs PRN per pt   More than a month     insulin pen needle (BD ELBERT U/F) 32G X 4 MM miscellaneous Use 1-2 pen needles daily or as directed. 200 each 3 11/7/2022 at 2200     Prenatal Vit-Fe Fumarate-FA (PRENATAL MULTIVITAMIN W/IRON) 27-0.8 MG tablet Take 1 tablet by mouth daily   11/8/2022 at 0900     [DISCONTINUED] insulin  UNIT/ML injection Inject 30-50 Units Subcutaneous At Bedtime Take 30 units at bedtime, as long as no lows <70, increase by 1 unit daily to maintain fasting BG <95. 60 mL 3 11/7/2022 at 2200     [DISCONTINUED] Loratadine (CLARITIN PO)    Past Week at 0900             Discharge Medications:     Current Discharge Medication List      START taking these medications    Details   acetaminophen (TYLENOL) 325 MG tablet Take 2 tablets (650 mg) by mouth every 4 hours as needed for mild pain or fever (greater than or equal to 38  C /100.4   F (oral) or 38.5  C/ 101.4  F (core).)    Associated Diagnoses: Normal labor and delivery      ibuprofen (ADVIL/MOTRIN) 800 MG tablet Take 1 tablet (800 mg) by mouth every 6 hours as needed for other (cramping)    Associated Diagnoses: Normal labor and delivery         CONTINUE these medications which have NOT CHANGED    Details   albuterol (PROAIR HFA/PROVENTIL HFA/VENTOLIN HFA) 108 (90 Base) MCG/ACT inhaler Inhale 1-2 puffs into the lungs PRN per pt      insulin pen needle (BD ELBERT U/F) 32G X 4 MM miscellaneous Use 1-2 pen needles daily or as directed.  Qty: 200 each, Refills: 3    Associated Diagnoses: History of insulin controlled gestational diabetes mellitus      Prenatal Vit-Fe Fumarate-FA (PRENATAL MULTIVITAMIN W/IRON) 27-0.8 MG tablet Take 1 tablet by mouth daily         STOP taking these medications       insulin  UNIT/ML injection Comments:   Reason for Stopping:         Loratadine (CLARITIN PO) Comments:   Reason for Stopping:                       Consultations:   No consultations were requested during this admission          Brief History of Labor or Admission:   Admitted for IOL    Destinee Caba is a 31 y.o. femalle  who presented for induction of labor who is now .      Cervical ripening with misoprostol was completed at 2230 on 22. The patient did well overnight with contractions every 4-5 minutes. AROM was completed at 0915 with rupture of clear fluid with some meconium. She completed the 1st stage of labor at 1052 after 1 hour and 38 minutes. The patient received an epidural for pain.      Delivery was uncomplicated. The second stage of labor lasted 1 hour and 10 minutes. She had a 2nd degree tear. Repaired with vicryl.      Placenta intact. EBL was 200+.               Hospital Course:   The patient's hospital course was unremarkable.  She had an uneventful . She recovered as anticipated and experienced nopost-operative complications. On discharge, her pain was  well controlled. Vaginal bleeding is similar to peak menstrual flow.  Voiding without difficulty.  Ambulating well and tolerating a normal diet.  No fever or significant wound drainage.  Breast feeding well.  Infant is stable. She was discharged on post-partum day #1    Fasting BS 92, stopping insulin.  Start home Fe 325 orally MWF    Post-partum hemoglobin:   Hemoglobin   Date Value Ref Range Status   11/10/2022 9.5 (L) 11.7 - 15.7 g/dL Final             Discharge Instructions and Follow-Up:     Discharge diet: Advance to a regular diet as tolerated   Discharge activity: No heavy lifting, pushing, pulling for 2 week(s)  Pelvic rest: abstain from intercourse and do not use tampons for 6 week(s)   Discharge follow-up: Follow up with primary care provider in 6-8 weeks, sooner if BP issues   Wound care: Drink plenty of fluids  Ice to area for comfort  Keep wound clean and dry           Discharge Disposition:     Discharged to home          Refugio Gallo MD

## 2022-11-10 NOTE — PROGRESS NOTES
S: Shift review   B:Destinee is a  who delivered vaginally today  A: VSS, patient is independent with mobility, denies need for pain control. IV saline locked. Fundus firm, lochia light. Showered. Voided. Handles baby with confidence.  R: Continue with routine PP care

## 2022-11-10 NOTE — PLAN OF CARE
S: Shift Note  B:  delivered via vaginal at 1202.  GBS negative, Rubella Immune. Pregnancy complicated by GDM .  A: Breast feeding with No problems  Vaginal bleeding within normal limits.   Refusing  pain medications. second tear with repair.   R: Continue postpartum cares. Anticipate discharge 24-48 hours following delivery.

## 2022-11-10 NOTE — ANESTHESIA POSTPROCEDURE EVALUATION
Patient: Destinee Caba    Procedure: * No procedures listed *       Anesthesia Type:  Epidural    Note:  Disposition: Inpatient   Postop Pain Control: Uneventful            Sign Out: Well controlled pain   PONV: No   Neuro/Psych: Uneventful            Sign Out: Acceptable/Baseline neuro status   Airway/Respiratory: Uneventful            Sign Out: Acceptable/Baseline resp. status   CV/Hemodynamics: Uneventful            Sign Out: Acceptable CV status   Other NRE: NONE   DID A NON-ROUTINE EVENT OCCUR? No    Event details/Postop Comments:  Pt was happy with her anesthesia care.  No complications.  I advised the pt she may have some soreness at the epidural site and this is normal.  However if the soreness continues over a week or if redness is noted around the site to let anesthesia know.  I will follow up with the pt if needed.           Last vitals:  There were no vitals filed for this visit.    Electronically Signed By: ZONIA Mahmood CRNA  November 10, 2022  4:51 PM

## 2022-11-10 NOTE — PROGRESS NOTES
S: Discharge  to home with  and     B: Patient had a Vaginal delivery with no complications. Baby boy Baby's name Nick, breast: . Support person's name Ryan.     A: VSS, attentive to baby, independent with cares, declines PO medication for pain, rubra light, good appetite, PPD 3.    R:  Discharge instructions reviewed and questions answered.  Belongings gathered and returned to the patient. Agreed to follow up in 6 weeks or sooner with any question or concerns with Dr. Hester for Post partum exam.     Nursing Discharge Checklist:    Pneumovax screened and given, if appropriate: N/A  Influenza vaccine screened and given, if appropriate: YES  Staples removed (): N/A  Breast milk returned: N/A  Hydrogel pads sent home:N/A  Birth Certificate Done: YES  Public Health Referral Made: N/A

## 2022-11-10 NOTE — DISCHARGE INSTRUCTIONS
Follow up with PCP in 6 wks for blood sugar and post partum exam    Postpartum Vaginal Delivery Instructions    Activity     Ask family and friends for help when you need it.  Do not place anything in your vagina for 6 weeks.  You are not restricted on other activities, but take it easy for a few weeks to allow your body to recover from delivery.  You are able to do any activities you feel up to that point.  No driving until you have stopped taking your pain medications (usually two weeks after delivery).     Call your health care provider if you have any of these symptoms:     Increased pain, swelling, redness, or fluid around your stiches from an episiotomy or perineal tear.  A fever above 100.4 F (38 C) with or without chills when placing a thermometer under your tongue.  You soak a sanitary pad with blood within 1 hour, or you see blood clots larger than a golf ball.  Bleeding that lasts more than 6 weeks.  Vaginal discharge that smells bad.  Severe pain, cramping or tenderness in your lower belly area.  A need to urinate more frequently (use the toilet more often), more urgently (use the toilet very quickly), or it burns when you urinate.  Nausea and vomiting.  Redness, swelling or pain around a vein in your leg.  Problems breastfeeding or a red or painful area on your breast.  Chest pain and cough or are gasping for air.  Problems coping with sadness, anxiety, or depression.  If you have any concerns about hurting yourself or the baby, call your provider immediately.   You have questions or concerns after you return home.     Keep your hands clean:  Always wash your hands before touching your perineal area and stitches.  This helps reduce your risk of infection.  If your hands aren't dirty, you may use an alcohol hand-rub to clean your hands. Keep your nails clean and short.

## 2022-11-14 NOTE — PROGRESS NOTES
"Destinee XIOMY Caba  Gender: female  : 1990  [address]  [facility] [address]  [phone] (home)   Medical Record: [id number]  Primary Care Provider: No Ref-Primary, Physician       [facility]   ?   Discharge Phone Call: Key Words/Key Times     How are you and the baby? good    How are feedings going? good    Voiding & Stooling? good    Any questions or concerns? no    Follow-up appointment? \"tomorrow\"      We want to provide excellent care here at [facility]. Do you have any feedback for us that would help us improve?     Call back COMMENTS:   \"Everything was good\"      Attempted Calls:   __2022 1112 callback completed per RAMYON Horn RN_______     __________    "

## 2022-12-20 ENCOUNTER — PRENATAL OFFICE VISIT (OUTPATIENT)
Dept: OBGYN | Facility: OTHER | Age: 32
End: 2022-12-20
Payer: COMMERCIAL

## 2022-12-20 VITALS
DIASTOLIC BLOOD PRESSURE: 68 MMHG | WEIGHT: 167.7 LBS | HEART RATE: 80 BPM | BODY MASS INDEX: 26.82 KG/M2 | SYSTOLIC BLOOD PRESSURE: 96 MMHG

## 2022-12-20 PROCEDURE — 99207 PR POST PARTUM EXAM: CPT | Performed by: OBSTETRICS & GYNECOLOGY

## 2022-12-20 ASSESSMENT — PATIENT HEALTH QUESTIONNAIRE - PHQ9
SUM OF ALL RESPONSES TO PHQ QUESTIONS 1-9: 0
SUM OF ALL RESPONSES TO PHQ QUESTIONS 1-9: 0
10. IF YOU CHECKED OFF ANY PROBLEMS, HOW DIFFICULT HAVE THESE PROBLEMS MADE IT FOR YOU TO DO YOUR WORK, TAKE CARE OF THINGS AT HOME, OR GET ALONG WITH OTHER PEOPLE: NOT DIFFICULT AT ALL

## 2022-12-20 NOTE — PROGRESS NOTES
SUBJECTIVE:  Destinee Caba,  is here for a postpartum visit.  She had a  on 2022 delivering a healthy baby boy, weighing 10 lbs 2.1 oz at term.      Last PHQ-9 score on record= No flowsheet data found.  No flowsheet data found.    Pap: declined    Delivery complications:  No  Breast feeding:  Yes,   Bladder problems:  No  Bowel problems/hemorrhoids:  No  Episiotomy/laceration/incision healed? No  Vaginal flow:  None  Kent:  No  Contraception: none natural family planning  Emotional adjustment:  doing well and happy  Back to work:      12 point review of systems negative other than symptoms noted below or in the HPI.    OBJECTIVE:  Vitals: LMP 2022   BMI= There is no height or weight on file to calculate BMI.  General - pleasant female in no acute distress.    ASSESSMENT:  No diagnosis found.    PLAN:  May resume normal activities without restrictions.  Pap smear was not done today.    Full counseling was provided, and all questions were answered.   Return to clinic in one year for an annual visit.   RTC PRN    There are no Patient Instructions on file for this visit.  Flaquito Hester MD

## 2022-12-20 NOTE — PATIENT INSTRUCTIONS
If you have any questions regarding your visit, Please contact your care team.     [employer] [facility]: [phone]    To Schedule an Appointment 24/7  [facility]: [phone]     Flaquito Hester MD    Medical Assistant    [facility]anna Winn-Surgery Scheduler  [facility]-Surgery Scheduler     Monday-[place]  1:00 pm-4:30 pm    Tuesday-[place]  7:30 am-4:30 pm    Wednesday-[place]  7:30 am-4:30 pm        Typical Surgery Days: Monday or Thursday       [address] [address]  [phone] appointment line  [phone] Fax    Imaging Scheduling all locations  [phone]    **Surgeries** Our Surgery Schedulers will contact you to schedule. If you do not receive a call within 3 business days, please call [phone].    If you need a medication refill, please contact your pharmacy. Please allow 3 business days for your refill to be completed.    As always, Thank you for trusting us with your healthcare needs!                   see additional instructions from your care team below

## 2023-08-05 ENCOUNTER — HEALTH MAINTENANCE LETTER (OUTPATIENT)
Age: 33
End: 2023-08-05

## 2024-01-02 ENCOUNTER — OFFICE VISIT (OUTPATIENT)
Dept: FAMILY MEDICINE | Facility: CLINIC | Age: 34
End: 2024-01-02
Payer: COMMERCIAL

## 2024-01-02 VITALS
RESPIRATION RATE: 16 BRPM | HEART RATE: 90 BPM | OXYGEN SATURATION: 97 % | WEIGHT: 170 LBS | BODY MASS INDEX: 27.32 KG/M2 | DIASTOLIC BLOOD PRESSURE: 66 MMHG | TEMPERATURE: 97.3 F | HEIGHT: 66 IN | SYSTOLIC BLOOD PRESSURE: 104 MMHG

## 2024-01-02 DIAGNOSIS — J03.01 ACUTE RECURRENT STREPTOCOCCAL TONSILLITIS: Primary | ICD-10-CM

## 2024-01-02 PROCEDURE — 99202 OFFICE O/P NEW SF 15 MIN: CPT | Performed by: STUDENT IN AN ORGANIZED HEALTH CARE EDUCATION/TRAINING PROGRAM

## 2024-01-02 ASSESSMENT — ASTHMA QUESTIONNAIRES
ACT_TOTALSCORE: 24
QUESTION_5 LAST FOUR WEEKS HOW WOULD YOU RATE YOUR ASTHMA CONTROL: COMPLETELY CONTROLLED
QUESTION_4 LAST FOUR WEEKS HOW OFTEN HAVE YOU USED YOUR RESCUE INHALER OR NEBULIZER MEDICATION (SUCH AS ALBUTEROL): NOT AT ALL
QUESTION_3 LAST FOUR WEEKS HOW OFTEN DID YOUR ASTHMA SYMPTOMS (WHEEZING, COUGHING, SHORTNESS OF BREATH, CHEST TIGHTNESS OR PAIN) WAKE YOU UP AT NIGHT OR EARLIER THAN USUAL IN THE MORNING: NOT AT ALL
QUESTION_2 LAST FOUR WEEKS HOW OFTEN HAVE YOU HAD SHORTNESS OF BREATH: ONCE OR TWICE A WEEK
QUESTION_1 LAST FOUR WEEKS HOW MUCH OF THE TIME DID YOUR ASTHMA KEEP YOU FROM GETTING AS MUCH DONE AT WORK, SCHOOL OR AT HOME: NONE OF THE TIME
ACT_TOTALSCORE: 24

## 2024-09-22 ENCOUNTER — HEALTH MAINTENANCE LETTER (OUTPATIENT)
Age: 34
End: 2024-09-22
